# Patient Record
Sex: FEMALE | Race: ASIAN | NOT HISPANIC OR LATINO | Employment: UNEMPLOYED | URBAN - METROPOLITAN AREA
[De-identification: names, ages, dates, MRNs, and addresses within clinical notes are randomized per-mention and may not be internally consistent; named-entity substitution may affect disease eponyms.]

---

## 2023-06-03 LAB
CHLAMYDIA TRACH., AMPLIFIED: NEGATIVE
N. GONORRHEA, AMPLIFIED: NEGATIVE

## 2023-07-13 LAB
ALANINE AMINOTRANSFERASE (SGPT) (U/L) IN SER/PLAS: 19 U/L (ref 7–45)
ALBUMIN (G/DL) IN SER/PLAS: 4.7 G/DL (ref 3.4–5)
ALKALINE PHOSPHATASE (U/L) IN SER/PLAS: 67 U/L (ref 33–110)
ANION GAP IN SER/PLAS: 14 MMOL/L (ref 10–20)
ASPARTATE AMINOTRANSFERASE (SGOT) (U/L) IN SER/PLAS: 17 U/L (ref 9–39)
BILIRUBIN TOTAL (MG/DL) IN SER/PLAS: 0.6 MG/DL (ref 0–1.2)
CALCIUM (MG/DL) IN SER/PLAS: 10.9 MG/DL (ref 8.6–10.6)
CARBON DIOXIDE, TOTAL (MMOL/L) IN SER/PLAS: 31 MMOL/L (ref 21–32)
CHLORIDE (MMOL/L) IN SER/PLAS: 99 MMOL/L (ref 98–107)
CHOLESTEROL (MG/DL) IN SER/PLAS: 200 MG/DL (ref 0–199)
CHOLESTEROL IN HDL (MG/DL) IN SER/PLAS: 45.6 MG/DL
CHOLESTEROL/HDL RATIO: 4.4
CREATININE (MG/DL) IN SER/PLAS: 0.74 MG/DL (ref 0.5–1.05)
GFR FEMALE: >90 ML/MIN/1.73M2
GLUCOSE (MG/DL) IN SER/PLAS: 102 MG/DL (ref 74–99)
LDL: 125 MG/DL (ref 0–99)
POTASSIUM (MMOL/L) IN SER/PLAS: 4.2 MMOL/L (ref 3.5–5.3)
PROTEIN TOTAL: 7.6 G/DL (ref 6.4–8.2)
SODIUM (MMOL/L) IN SER/PLAS: 140 MMOL/L (ref 136–145)
THYROTROPIN (MIU/L) IN SER/PLAS BY DETECTION LIMIT <= 0.05 MIU/L: 1.03 MIU/L (ref 0.44–3.98)
TRIGLYCERIDE (MG/DL) IN SER/PLAS: 146 MG/DL (ref 0–149)
UREA NITROGEN (MG/DL) IN SER/PLAS: 17 MG/DL (ref 6–23)
VLDL: 29 MG/DL (ref 0–40)

## 2023-07-20 ENCOUNTER — HOSPITAL ENCOUNTER (OUTPATIENT)
Dept: DATA CONVERSION | Facility: HOSPITAL | Age: 58
End: 2023-07-20
Attending: INTERNAL MEDICINE | Admitting: INTERNAL MEDICINE
Payer: COMMERCIAL

## 2023-07-20 DIAGNOSIS — E66.01 MORBID (SEVERE) OBESITY DUE TO EXCESS CALORIES (MULTI): ICD-10-CM

## 2023-07-20 DIAGNOSIS — K63.5 POLYP OF COLON: ICD-10-CM

## 2023-07-20 DIAGNOSIS — E55.9 VITAMIN D DEFICIENCY, UNSPECIFIED: ICD-10-CM

## 2023-07-20 DIAGNOSIS — K64.4 RESIDUAL HEMORRHOIDAL SKIN TAGS: ICD-10-CM

## 2023-07-20 DIAGNOSIS — G47.30 SLEEP APNEA, UNSPECIFIED: ICD-10-CM

## 2023-07-20 DIAGNOSIS — D12.5 BENIGN NEOPLASM OF SIGMOID COLON: ICD-10-CM

## 2023-07-20 DIAGNOSIS — I10 ESSENTIAL (PRIMARY) HYPERTENSION: ICD-10-CM

## 2023-07-20 DIAGNOSIS — Z12.11 ENCOUNTER FOR SCREENING FOR MALIGNANT NEOPLASM OF COLON: ICD-10-CM

## 2023-07-20 DIAGNOSIS — K64.8 OTHER HEMORRHOIDS: ICD-10-CM

## 2023-07-20 DIAGNOSIS — Z87.891 PERSONAL HISTORY OF NICOTINE DEPENDENCE: ICD-10-CM

## 2023-07-27 LAB
COMPLETE PATHOLOGY REPORT: NORMAL
CONVERTED CLINICAL DIAGNOSIS-HISTORY: NORMAL
CONVERTED FINAL DIAGNOSIS: NORMAL
CONVERTED FINAL REPORT PDF LINK TO COPY AND PASTE: NORMAL
CONVERTED GROSS DESCRIPTION: NORMAL

## 2023-08-07 ENCOUNTER — HOSPITAL ENCOUNTER (OUTPATIENT)
Dept: DATA CONVERSION | Facility: HOSPITAL | Age: 58
End: 2023-08-07
Attending: SURGERY | Admitting: SURGERY
Payer: COMMERCIAL

## 2023-08-07 DIAGNOSIS — K31.89 OTHER DISEASES OF STOMACH AND DUODENUM: ICD-10-CM

## 2023-08-07 DIAGNOSIS — K22.70 BARRETT'S ESOPHAGUS WITHOUT DYSPLASIA: ICD-10-CM

## 2023-08-07 DIAGNOSIS — E66.01 MORBID (SEVERE) OBESITY DUE TO EXCESS CALORIES (MULTI): ICD-10-CM

## 2023-08-07 DIAGNOSIS — K44.9 DIAPHRAGMATIC HERNIA WITHOUT OBSTRUCTION OR GANGRENE: ICD-10-CM

## 2023-08-07 DIAGNOSIS — R12 HEARTBURN: ICD-10-CM

## 2023-08-07 DIAGNOSIS — K29.70 GASTRITIS, UNSPECIFIED, WITHOUT BLEEDING: ICD-10-CM

## 2023-08-30 ENCOUNTER — LAB (OUTPATIENT)
Dept: LAB | Facility: LAB | Age: 58
End: 2023-08-30
Payer: COMMERCIAL

## 2023-08-30 LAB
ACTIVATED PARTIAL THROMBOPLASTIN TIME IN PPP BY COAGULATION ASSAY: 29 SEC (ref 27–38)
ALANINE AMINOTRANSFERASE (SGPT) (U/L) IN SER/PLAS: 23 U/L (ref 7–45)
ALBUMIN (G/DL) IN SER/PLAS: 4.4 G/DL (ref 3.4–5)
ALKALINE PHOSPHATASE (U/L) IN SER/PLAS: 64 U/L (ref 33–110)
AMPHETAMINE (PRESENCE) IN URINE BY SCREEN METHOD: NORMAL
ANION GAP IN SER/PLAS: 13 MMOL/L (ref 10–20)
ASPARTATE AMINOTRANSFERASE (SGOT) (U/L) IN SER/PLAS: 17 U/L (ref 9–39)
BARBITURATES PRESENCE IN URINE BY SCREEN METHOD: NORMAL
BASOPHILS (10*3/UL) IN BLOOD BY AUTOMATED COUNT: 0.07 X10E9/L (ref 0–0.1)
BASOPHILS/100 LEUKOCYTES IN BLOOD BY AUTOMATED COUNT: 1 % (ref 0–2)
BENZODIAZEPINE (PRESENCE) IN URINE BY SCREEN METHOD: NORMAL
BILIRUBIN TOTAL (MG/DL) IN SER/PLAS: 0.7 MG/DL (ref 0–1.2)
C PEPTIDE (NG/ML) IN SER/PLAS: 2.9 NG/ML (ref 0.7–3.9)
CALCIDIOL (25 OH VITAMIN D3) (NG/ML) IN SER/PLAS: 37 NG/ML
CALCIUM (MG/DL) IN SER/PLAS: 10.6 MG/DL (ref 8.6–10.6)
CANNABINOIDS IN URINE BY SCREEN METHOD: NORMAL
CARBON DIOXIDE, TOTAL (MMOL/L) IN SER/PLAS: 30 MMOL/L (ref 21–32)
CHLORIDE (MMOL/L) IN SER/PLAS: 102 MMOL/L (ref 98–107)
CHOLESTEROL (MG/DL) IN SER/PLAS: 185 MG/DL (ref 0–199)
CHOLESTEROL IN HDL (MG/DL) IN SER/PLAS: 45.3 MG/DL
CHOLESTEROL/HDL RATIO: 4.1
COBALAMIN (VITAMIN B12) (PG/ML) IN SER/PLAS: 1101 PG/ML (ref 211–911)
COCAINE (PRESENCE) IN URINE BY SCREEN METHOD: NORMAL
CREATININE (MG/DL) IN SER/PLAS: 0.65 MG/DL (ref 0.5–1.05)
DRUG SCREEN COMMENT URINE: NORMAL
EOSINOPHILS (10*3/UL) IN BLOOD BY AUTOMATED COUNT: 0.14 X10E9/L (ref 0–0.7)
EOSINOPHILS/100 LEUKOCYTES IN BLOOD BY AUTOMATED COUNT: 2.1 % (ref 0–6)
ERYTHROCYTE DISTRIBUTION WIDTH (RATIO) BY AUTOMATED COUNT: 13 % (ref 11.5–14.5)
ERYTHROCYTE MEAN CORPUSCULAR HEMOGLOBIN CONCENTRATION (G/DL) BY AUTOMATED: 32.6 G/DL (ref 32–36)
ERYTHROCYTE MEAN CORPUSCULAR VOLUME (FL) BY AUTOMATED COUNT: 87 FL (ref 80–100)
ERYTHROCYTES (10*6/UL) IN BLOOD BY AUTOMATED COUNT: 4.41 X10E12/L (ref 4–5.2)
ESTIMATED AVERAGE GLUCOSE FOR HBA1C: 131 MG/DL
FENTANYL URINE: NORMAL
FERRITIN (UG/LL) IN SER/PLAS: 171 UG/L (ref 8–150)
FOLATE (NG/ML) IN SER/PLAS: >24 NG/ML
GFR FEMALE: >90 ML/MIN/1.73M2
GLUCOSE (MG/DL) IN SER/PLAS: 99 MG/DL (ref 74–99)
HEMATOCRIT (%) IN BLOOD BY AUTOMATED COUNT: 38.4 % (ref 36–46)
HEMOGLOBIN (G/DL) IN BLOOD: 12.5 G/DL (ref 12–16)
HEMOGLOBIN A1C/HEMOGLOBIN TOTAL IN BLOOD: 6.2 %
IMMATURE GRANULOCYTES/100 LEUKOCYTES IN BLOOD BY AUTOMATED COUNT: 0.1 % (ref 0–0.9)
INR IN PPP BY COAGULATION ASSAY: 1 (ref 0.9–1.1)
IRON (UG/DL) IN SER/PLAS: 103 UG/DL (ref 35–150)
IRON BINDING CAPACITY (UG/DL) IN SER/PLAS: 376 UG/DL (ref 240–445)
IRON SATURATION (%) IN SER/PLAS: 27 % (ref 25–45)
LDL: 115 MG/DL (ref 0–99)
LEUKOCYTES (10*3/UL) IN BLOOD BY AUTOMATED COUNT: 6.7 X10E9/L (ref 4.4–11.3)
LYMPHOCYTES (10*3/UL) IN BLOOD BY AUTOMATED COUNT: 2.28 X10E9/L (ref 1.2–4.8)
LYMPHOCYTES/100 LEUKOCYTES IN BLOOD BY AUTOMATED COUNT: 33.8 % (ref 13–44)
MONOCYTES (10*3/UL) IN BLOOD BY AUTOMATED COUNT: 0.56 X10E9/L (ref 0.1–1)
MONOCYTES/100 LEUKOCYTES IN BLOOD BY AUTOMATED COUNT: 8.3 % (ref 2–10)
NEUTROPHILS (10*3/UL) IN BLOOD BY AUTOMATED COUNT: 3.68 X10E9/L (ref 1.2–7.7)
NEUTROPHILS/100 LEUKOCYTES IN BLOOD BY AUTOMATED COUNT: 54.7 % (ref 40–80)
NRBC (PER 100 WBCS) BY AUTOMATED COUNT: 0 /100 WBC (ref 0–0)
OPIATES (PRESENCE) IN URINE BY SCREEN METHOD: NORMAL
OXYCODONE (PRESENCE) IN URINE BY SCREEN METHOD: NORMAL
PARATHYRIN INTACT (PG/ML) IN SER/PLAS: 56.6 PG/ML (ref 18.5–88)
PHENCYCLIDINE (PRESENCE) IN URINE BY SCREEN METHOD: NORMAL
PLATELETS (10*3/UL) IN BLOOD AUTOMATED COUNT: 321 X10E9/L (ref 150–450)
POTASSIUM (MMOL/L) IN SER/PLAS: 4.1 MMOL/L (ref 3.5–5.3)
PROTEIN TOTAL: 7.3 G/DL (ref 6.4–8.2)
PROTHROMBIN TIME (PT) IN PPP BY COAGULATION ASSAY: 10.7 SEC (ref 9.8–12.8)
SODIUM (MMOL/L) IN SER/PLAS: 141 MMOL/L (ref 136–145)
THYROTROPIN (MIU/L) IN SER/PLAS BY DETECTION LIMIT <= 0.05 MIU/L: 1.05 MIU/L (ref 0.44–3.98)
TRIGLYCERIDE (MG/DL) IN SER/PLAS: 123 MG/DL (ref 0–149)
UREA NITROGEN (MG/DL) IN SER/PLAS: 22 MG/DL (ref 6–23)
VLDL: 25 MG/DL (ref 0–40)

## 2023-08-31 LAB — H. PYLORI UBIT: NEGATIVE

## 2023-09-02 LAB
COPPER: 116.1 UG/DL (ref 80–155)
ZINC,SERUM OR PLASMA: 90.7 UG/DL (ref 60–120)

## 2023-09-04 LAB
COTININE BLOOD QUANTITATIVE: <5 NG/ML
NICOTINE BLOOD QUANTITATIVE: <5 NG/ML

## 2023-09-06 LAB — VITAMIN B1, WHOLE BLOOD: 138 NMOL/L (ref 70–180)

## 2023-09-08 PROBLEM — D12.6 TUBULAR ADENOMA OF COLON: Status: ACTIVE | Noted: 2023-09-08

## 2023-09-08 PROBLEM — R94.31 ABNORMAL EKG: Status: ACTIVE | Noted: 2023-09-08

## 2023-09-08 PROBLEM — R06.83 SNORING: Status: ACTIVE | Noted: 2023-09-08

## 2023-09-08 PROBLEM — H90.3 SENSORINEURAL HEARING LOSS, BILATERAL: Status: ACTIVE | Noted: 2023-09-08

## 2023-09-08 PROBLEM — R63.5 ABNORMAL WEIGHT GAIN: Status: ACTIVE | Noted: 2023-09-08

## 2023-09-08 PROBLEM — E66.01 MORBID OBESITY WITH BMI OF 40.0-44.9, ADULT (MULTI): Status: ACTIVE | Noted: 2023-09-08

## 2023-09-08 PROBLEM — F54 PSYCHOLOGICAL FACTORS AFFECTING MORBID OBESITY (MULTI): Status: ACTIVE | Noted: 2023-09-08

## 2023-09-08 PROBLEM — G47.33 OSA (OBSTRUCTIVE SLEEP APNEA): Status: ACTIVE | Noted: 2023-09-08

## 2023-09-08 PROBLEM — H60.8X3 CHRONIC ECZEMATOUS OTITIS EXTERNA OF BOTH EARS: Status: ACTIVE | Noted: 2023-09-08

## 2023-09-08 PROBLEM — G47.9 DISTURBANCE IN SLEEP BEHAVIOR: Status: ACTIVE | Noted: 2023-09-08

## 2023-09-08 PROBLEM — K08.9 DENTAL DISORDER: Status: ACTIVE | Noted: 2023-09-08

## 2023-09-08 PROBLEM — N63.20 LEFT BREAST MASS: Status: ACTIVE | Noted: 2023-09-08

## 2023-09-08 PROBLEM — I10 HYPERTENSION: Status: ACTIVE | Noted: 2023-09-08

## 2023-09-08 PROBLEM — E66.01 PSYCHOLOGICAL FACTORS AFFECTING MORBID OBESITY (MULTI): Status: ACTIVE | Noted: 2023-09-08

## 2023-09-08 PROBLEM — K21.9 GASTROESOPHAGEAL REFLUX DISEASE: Status: ACTIVE | Noted: 2023-09-08

## 2023-09-08 PROBLEM — R92.8 ABNORMAL MAMMOGRAM: Status: ACTIVE | Noted: 2023-09-08

## 2023-09-08 PROBLEM — F43.21 GRIEVING: Status: ACTIVE | Noted: 2023-09-08

## 2023-09-08 PROBLEM — I45.10 INCOMPLETE RIGHT BUNDLE BRANCH BLOCK (RBBB): Status: ACTIVE | Noted: 2023-09-08

## 2023-09-08 RX ORDER — FUROSEMIDE 40 MG/1
40 TABLET ORAL 2 TIMES DAILY
COMMUNITY
Start: 2011-11-05 | End: 2023-10-24 | Stop reason: ALTCHOICE

## 2023-09-08 RX ORDER — OMEPRAZOLE 40 MG/1
40 CAPSULE, DELAYED RELEASE ORAL DAILY
COMMUNITY
End: 2023-10-24 | Stop reason: ALTCHOICE

## 2023-09-08 RX ORDER — POTASSIUM CHLORIDE 20 MEQ/1
20 TABLET, EXTENDED RELEASE ORAL 2 TIMES DAILY
COMMUNITY
Start: 2011-11-05 | End: 2023-10-24 | Stop reason: ALTCHOICE

## 2023-09-08 RX ORDER — LISINOPRIL AND HYDROCHLOROTHIAZIDE 20; 25 MG/1; MG/1
1 TABLET ORAL DAILY
COMMUNITY
End: 2023-10-12 | Stop reason: ALTCHOICE

## 2023-09-08 RX ORDER — PHENTERMINE HYDROCHLORIDE 37.5 MG/1
37.5 TABLET ORAL DAILY
COMMUNITY
End: 2023-10-12 | Stop reason: ALTCHOICE

## 2023-09-08 RX ORDER — LEVOCETIRIZINE DIHYDROCHLORIDE 5 MG/1
1 TABLET, FILM COATED ORAL EVERY EVENING
COMMUNITY
Start: 2022-07-29 | End: 2023-10-24 | Stop reason: ALTCHOICE

## 2023-09-08 RX ORDER — AMITRIPTYLINE HYDROCHLORIDE 25 MG/1
1-2 TABLET, FILM COATED ORAL NIGHTLY
COMMUNITY
Start: 2022-07-29 | End: 2023-10-24 | Stop reason: ALTCHOICE

## 2023-09-08 RX ORDER — GLUCOSAMINE/MSM/CHONDROIT SULF 500-166.6
TABLET ORAL
COMMUNITY
End: 2024-01-17 | Stop reason: WASHOUT

## 2023-09-08 RX ORDER — CETIRIZINE HYDROCHLORIDE 10 MG/1
1 TABLET ORAL NIGHTLY
COMMUNITY
Start: 2022-08-25 | End: 2023-10-24 | Stop reason: ALTCHOICE

## 2023-09-29 VITALS — BODY MASS INDEX: 40.81 KG/M2 | HEIGHT: 65 IN | WEIGHT: 244.93 LBS

## 2023-10-10 ENCOUNTER — TELEMEDICINE CLINICAL SUPPORT (OUTPATIENT)
Dept: SURGERY | Facility: CLINIC | Age: 58
End: 2023-10-10
Payer: COMMERCIAL

## 2023-10-10 ENCOUNTER — DOCUMENTATION (OUTPATIENT)
Dept: CARDIOLOGY | Facility: CLINIC | Age: 58
End: 2023-10-10

## 2023-10-10 ENCOUNTER — TELEPHONE (OUTPATIENT)
Dept: SURGERY | Facility: CLINIC | Age: 58
End: 2023-10-10

## 2023-10-10 VITALS — WEIGHT: 242 LBS | BODY MASS INDEX: 40.32 KG/M2 | HEIGHT: 65 IN

## 2023-10-10 DIAGNOSIS — E66.01 MORBID OBESITY WITH BMI OF 40.0-44.9, ADULT (MULTI): Primary | ICD-10-CM

## 2023-10-10 PROBLEM — Z01.810 ENCOUNTER FOR PRE-OPERATIVE CARDIOVASCULAR CLEARANCE: Status: ACTIVE | Noted: 2023-10-10

## 2023-10-10 NOTE — PROGRESS NOTES
"PREOPERATIVE, MULTIDISCIPLINARY, MEDICALLY SUPERVISED, REDUCED CALORIE DIET, BEHAVIOR MODIFICATION AND EXERCISE PROGRAM    S:  The patient has been working on practicing pre-op behaviors.  24 hour food recall: B: Slimfast high protein shake; L: Premier Protein shake and D: riced cauliflower and beef cubes    O:   weight: 242.0 lbs.     Ht:    1.651 m (5' 5\")        BMI: 40.7     Goal: 5% body weight loss over the course of program    Dietary recommendation:   1. Continue to drink at least 64 ounces of water and calorie free, non-carbonated, and decaffeinated beverages daily  2. Practice the 30-30-30 rule by drinking between meals.  3. Structure your meal plan - have 3 meals and 1 snack daily.  4. Have balanced meals that always contain a good source of protein.  5. Increase intake of non-starchy vegetables.  Have 5 servings fruits and vegetables daily.   6. Continue to take a multivitamin daily.  7. Increase physical activity by 10-15 minutes to an end goal of 60 minutes 5 x per week.    Group Topic: Eating Triggers and Controlling Environment    Behavioral recommendation: Pt will be able to identify eating triggers and how to avoid them.    A/P: Pt appears to be able to recognize eating triggers and how to avoid eating when not hungry and/or not eating when it is not time for a meal or snack. Pt will continue to practice being mindful of healthy eating habits.    Exercise: Cardio 4x/wk for 45-60 min.  T  he nutritional clearance for surgery.  She is scheduled for the [facility] zoom on 10-18-23 and will follow-up in 1 month for accountability ahead of surgery.    Tonya Gonzalez, KYLE, LD  "

## 2023-10-10 NOTE — PROGRESS NOTES
Chief Complaint    DEX TOLEDO is being seen for a cardiovascular evaluation of pre-operative clearance.      History of Present Illness  57 yo female with history significant for HTN, former smoker and BMI 40.8 who was referred to cardiology for preop clearance for bariatric surgery. She exercises routinely and can climb 2 flights of stairs w/o problems other than knee pain. She is asymptomatic from the cardiac stand point and denies chest pain, SANTILLAN, orthopnea, NPD, nocturia, palpitations, dizziness, syncope or claudication. Her HTN is well controlled.     Her EKG shows IRBB plus negative T waves in V1 and V3 with nonspecific repolarization abnormalities in the other precordial leads. No prior tracings to compare.     Her most recent lipid panel from 7/2023 showed borderline cholesterol with mildly elevated LDL (125). CMP showed an elevated total calcium but when corrected by albuminemia is within normal range.      Active Problems  Problems    · Abnormal mammogram (793.80) (R92.8)   · Abnormal weight gain (783.1) (R63.5)   · Allergic rhinitis (477.9) (J30.9)   · Bariatric surgery status (V45.86) (Z98.84)   · Breast asymmetry (611.89) (N64.89)   · Chronic eczematous otitis externa of both ears (380.23) (H60.8X3)   · Colonoscopy planned   · Disturbance in sleep behavior (780.50) (G47.9)   · Encounter for gynecological examination (V72.31) (Z01.419)   · Gastroesophageal reflux disease (530.81) (K21.9)   · Hypertension (401.9) (I10)   · Menopausal symptoms (627.2) (N95.1)   · Morbid obesity with BMI of 40.0-44.9, adult (278.01,V85.41) (E66.01,Z68.41)   · Obesity (278.00) (E66.9)   · Obstructive sleep apnea, adult (327.23) (G47.33)   · History of Otalgia, right (388.70) (H92.01)   · Pain, hand (729.5) (M79.643)   · Pre-bariatric surgery nutrition evaluation (V65.3) (Z71.3)   · Psychological factors affecting morbid obesity (316,278.01) (E66.01,F54)   · Right ankle pain (719.47) (M25.571)   · History of Right foot pain  (729.5) (M79.671)   · History of Right knee pain (719.46) (M25.561)   · Sensorineural hearing loss, bilateral (389.18) (H90.3)   · Snoring (786.09) (R06.83)   · History of Sudden death of family member (V61.07) (Z63.4)    Surgical History  Problems    · History of Back Surgery   · History of Bunionectomy   · History of Hip surgery   · History of Knee Replacement    Past Medical History  Problems    · Abnormal weight gain (783.1) (R63.5)   · Allergic rhinitis (477.9) (J30.9)   · Disturbance in sleep behavior (780.50) (G47.9)   · Gastroesophageal reflux disease (530.81) (K21.9)   · Hypertension (401.9) (I10)   · Obesity (278.00) (E66.9)   · History of Otalgia, right (388.70) (H92.01)   · Resolved Date: 04 Jun 2018   · Pain, hand (729.5) (M79.643)   · Right ankle pain (719.47) (M25.571)   · History of Right foot pain (729.5) (M79.671)   · Resolved Date: 04 Jun 2018   · History of Right knee pain (719.46) (M25.561)   · Resolved Date: 04 Jun 2018   · Snoring (786.09) (R06.83)   · History of Sudden death of family member (V61.07) (Z63.4)   · Resolved Date: 04 Jun 2018    Current Meds    Medication Name Instruction   Collagen Ultra Oral Capsule TAKE 1 CAPSULE Daily   Lisinopril-hydroCHLOROthiazide 20-25 MG Oral Tablet TAKE 1 TABLET BY MOUTH EVERY DAY   Omeprazole 40 MG Oral Capsule Delayed Release TAKE 1 CAPSULE BY MOUTH EVERY DAY   PEG 3350-KCl-Na Bicarb-NaCl 420 GM Oral Solution Reconstituted MIX AND DRINK 4000 ML AS PER SPLIT DOSE  ENDOSCOPY INSTRUCTIONS   Probiotic CAPS USE AS DIRECTED.   Vitamin D CAPS      Allergies  Medication    · No Known Drug Allergies  Recorded By: Jeanne Cloud; 3/25/2015 12:02:33 PM    Family History  Mother    · Family history of diabetes mellitus (V18.0) (Z83.3)   · Family history of hypertension (V17.49) (Z82.49)  Father    · Family history of diabetes mellitus (V18.0) (Z83.3)   · Family history of hypertension (V17.49) (Z82.49)  Brother    · Family history of diabetes mellitus (V18.0)  (Z83.3)    Social History  Problems    · Former smoker (V15.82) (Z87.891)   · Part-time employment   · Single   · Tobacco use (305.1) (Z72.0)    Review of Systems  A 14 point review of systems was asked. All questions were negative except for pertinent positives listed in the HPI.     Vitals  Vital Signs    Recorded: [id number] 08:59AM   Heart Rate 77   Systolic 118, LUE, Sitting   Diastolic 80, LUE, Sitting   Height 5 ft 5 in   Weight 245 lb 8 oz   BMI Calculated 40.85 kg/m2   BSA Calculated 2.16   Tobacco Use b) No   Falls Screening (Age 18+) a) No falls within the last year   O2 Saturation 94   Pain Scale 0     Physical Exam  General: Sitting up comfortably in chair; in no apparent distress.  HEENT: Normocephalic; atraumatic. Well hydrated.  Eyes: Anicteric sclera. Extraocular movement intact  Neck: Supple; no thyromegaly; normal jugular venous pressure.  Respiratory: Bilateral air entry equal. No wheezing.  Cardiovascular: Normal S1, S2; no murmurs auscultated.  Abdomen: Nondistended; nontender. (+) bowel sounds  Extremities: No peripheral edema present. Pulses 2+ diffusely  Neurological: Oriented to time, place, and person; nonfocal.  Psychiatric: Normal affect.    Impressions    59 yo female with history of HTN, former smoker and BMI 40.8 who was referred for preop clearance for bariatric surgery and is asymptomatic from the cardiac stand point with reasonable exercise capacity. Her EKG is abnormal d/t IRBB and precordial negative T waves.  She underwent en echocardiogram on 9/18/2023 which showed a left ventricle with elevated ejection fraction and a normal RVSP.   Whit this result,  there is no contraindication for bariatric surgery with general anesthesia from the cardiovascular standpoint. Her predicted perioperative 30 days MACE risk is 0.1%.     Wes Edmonds MD

## 2023-10-10 NOTE — TELEPHONE ENCOUNTER
Spoke with patient about outstanding clearances for Bariatric Surgery. Pulm and NBP class scheduled for later in October. Emailed to pts cardiologist to see if the patient is cleared from ECG done in September.

## 2023-10-12 ENCOUNTER — OFFICE VISIT (OUTPATIENT)
Dept: PRIMARY CARE | Facility: CLINIC | Age: 58
End: 2023-10-12
Payer: COMMERCIAL

## 2023-10-12 VITALS — DIASTOLIC BLOOD PRESSURE: 68 MMHG | SYSTOLIC BLOOD PRESSURE: 105 MMHG | BODY MASS INDEX: 38.94 KG/M2 | WEIGHT: 234 LBS

## 2023-10-12 DIAGNOSIS — E66.9 CLASS 2 OBESITY WITHOUT SERIOUS COMORBIDITY WITH BODY MASS INDEX (BMI) OF 38.0 TO 38.9 IN ADULT, UNSPECIFIED OBESITY TYPE: ICD-10-CM

## 2023-10-12 DIAGNOSIS — M79.642 HAND PAIN, LEFT: ICD-10-CM

## 2023-10-12 DIAGNOSIS — R20.0 NUMBNESS OF FINGERS OF BOTH HANDS: ICD-10-CM

## 2023-10-12 DIAGNOSIS — E55.9 VITAMIN D DEFICIENCY: ICD-10-CM

## 2023-10-12 DIAGNOSIS — I10 HYPERTENSION, UNSPECIFIED TYPE: Primary | ICD-10-CM

## 2023-10-12 PROCEDURE — 3008F BODY MASS INDEX DOCD: CPT | Performed by: INTERNAL MEDICINE

## 2023-10-12 PROCEDURE — 3074F SYST BP LT 130 MM HG: CPT | Performed by: INTERNAL MEDICINE

## 2023-10-12 PROCEDURE — 3078F DIAST BP <80 MM HG: CPT | Performed by: INTERNAL MEDICINE

## 2023-10-12 PROCEDURE — 99214 OFFICE O/P EST MOD 30 MIN: CPT | Performed by: INTERNAL MEDICINE

## 2023-10-12 RX ORDER — IBUPROFEN 800 MG/1
800 TABLET ORAL 2 TIMES DAILY
Qty: 14 TABLET | Refills: 0 | Status: SHIPPED | OUTPATIENT
Start: 2023-10-12 | End: 2023-10-19

## 2023-10-12 RX ORDER — LISINOPRIL AND HYDROCHLOROTHIAZIDE 10; 12.5 MG/1; MG/1
1 TABLET ORAL DAILY
Qty: 90 TABLET | Refills: 1 | Status: SHIPPED | OUTPATIENT
Start: 2023-10-12 | End: 2024-04-09

## 2023-10-12 RX ORDER — DEXTROMETHORPHAN HYDROBROMIDE, GUAIFENESIN 5; 100 MG/5ML; MG/5ML
650 LIQUID ORAL EVERY 8 HOURS PRN
COMMUNITY

## 2023-10-12 RX ORDER — ERGOCALCIFEROL 1.25 MG/1
50000 CAPSULE ORAL
Qty: 12 CAPSULE | Refills: 0 | Status: SHIPPED | OUTPATIENT
Start: 2023-10-12 | End: 2023-11-06

## 2023-10-12 ASSESSMENT — PATIENT HEALTH QUESTIONNAIRE - PHQ9
1. LITTLE INTEREST OR PLEASURE IN DOING THINGS: SEVERAL DAYS
2. FEELING DOWN, DEPRESSED OR HOPELESS: NOT AT ALL
SUM OF ALL RESPONSES TO PHQ9 QUESTIONS 1 AND 2: 1

## 2023-10-12 NOTE — PROGRESS NOTES
Subjective   Patient ID: Leonarda Quevedo is a 58 y.o. female who presents for FUV., Blood Pressure Check, and Hand Pain.    HPI  Patient in for a visit  Doing instacart carrying a lot of grocery bags  Starting a new job at  in [employer] next week  Had a whole bunch of labwork done with bariatric surgery Dr Polk , still has to see dietitian ,goal to lose  5% of her weight when she saw surgeon   Review of Systems  General: Denies fever, chills, night sweats,  Eyes: Negative for recent visual changes  Ears, Nose, Throat :  Negative for hearing changes, sinus discomfort  Dermatologic: Negative for new skin conditions, rash  Respiratory: Negative for wheezing, shortness of breath, cough  Cardiovascular: Negative for chest pain, palpitations, or leg swelling  Gastrointestinal: Negative for nausea/vomiting, abdominal pain, changes in bowel habits  Genitourinary NEGATIVE FOR URINARY INCONTINENCE urgency , frequency, discomfort   Musculoskeletal: see hpi  Neurological: Negative for headaches, dizziness    Previous history  Past Medical History:   Diagnosis Date    Abnormal weight gain 06/24/2022    Abnormal weight gain    Abrasion of unspecified wrist, initial encounter 09/21/2017    Cat scratch of wrist    Adjustment disorder with depressed mood 03/08/2017    Grieving    Allergic rhinitis, unspecified 08/25/2022    Allergic rhinitis    Allergy, unspecified, initial encounter 07/29/2022    Allergy    Cough, unspecified 09/10/2018    Cough    Disappearance and death of family member 10/06/2016    Sudden death of family member    Disorder of teeth and supporting structures, unspecified 02/22/2016    Dental disorder    Dorsalgia, unspecified 07/29/2022    Back pain    Encounter for gynecological examination (general) (routine) without abnormal findings 03/08/2017    Visit for gynecologic examination    Encounter for other screening for malignant neoplasm of breast 09/07/2022    Breast screening    Encounter for screening for  malignant neoplasm of colon 09/07/2022    Colon cancer screening    Essential (primary) hypertension 11/09/2022    Hypertension    Gastro-esophageal reflux disease without esophagitis 09/07/2022    Gastroesophageal reflux disease    Obesity, unspecified 11/09/2022    Obesity    Otalgia, right ear 02/08/2016    Otalgia, right    Other conditions influencing health status 03/29/2017    History of cough    Other long term (current) drug therapy 11/09/2022    Medication management    Other specified soft tissue disorders 02/08/2016    Swelling of left hand    Pain in right ankle and joints of right foot 03/08/2017    Right ankle pain    Pain in right foot 12/08/2016    Right foot pain    Pain in right hand 09/07/2022    Bilateral hand pain    Pain in right hand 04/16/2018    Bilateral hand pain    Pain in right knee 07/29/2022    Bilateral knee pain    Pain in right knee 03/07/2018    Right knee pain    Pain in right shoulder 03/12/2020    Bilateral shoulder pain    Pain in unspecified hand 09/07/2022    Pain, hand    Personal history of other diseases of the nervous system and sense organs 12/08/2016    History of sleep disturbance    Pruritus, unspecified 11/09/2022    Ear itching     Past Surgical History:   Procedure Laterality Date    BACK SURGERY  03/25/2015    Back Surgery    TOTAL KNEE ARTHROPLASTY  03/25/2015    Knee Replacement     Social History     Tobacco Use    Smoking status: Former     Types: Cigarettes    Smokeless tobacco: Never   Substance Use Topics    Alcohol use: Yes     Comment: rarely     Family History   Problem Relation Name Age of Onset    Hypertension Mother      Diabetes Mother      Hypertension Father      Diabetes Father      Diabetes Brother       No Known Allergies  Current Outpatient Medications   Medication Instructions    acetaminophen (TYLENOL 8 HOUR) 650 mg, oral, Every 8 hours PRN, Do not crush, chew, or split.    amitriptyline (Elavil) 25 mg tablet 1-2 tablets, oral, Nightly     ascorbic acid/collagen hydr (COLLAGEN SKIN RENEWAL ORAL) 1 capsule, oral, Daily    cetirizine (ZyrTEC) 10 mg tablet 1 tablet, oral, Nightly    ergocalciferol, vitamin D2, (VITAMIN D2 ORAL) oral    furosemide (LASIX) 40 mg, oral, 2 times daily    L. acidophilus/Bifid. animalis 32 billion cell capsule oral    levocetirizine (Xyzal) 5 mg tablet 1 tablet, oral, Every evening    lisinopriL-hydrochlorothiazide 20-25 mg tablet 1 tablet, oral, Daily    multivitamin capsule 1 capsule, oral, Daily    omega 3-dha-epa-fish oil 360 mg-108 mg- 180 mg-1,200 mg capsule oral    omeprazole (PRILOSEC) 40 mg, oral, Daily    phentermine (ADIPEX-P) 37.5 mg, oral, Daily, as directed    potassium chloride CR 20 mEq ER tablet 20 mEq, oral, 2 times daily       Objective       Physical Exam  Vital Signs: as recorded above  General: Well groomed, well nourished with her mother with consent   Orientation:  Alert , oriented to time, place , and person   Mood and Affect:  Cooperative , no apparent distress normal affect  Skin: Good color, good turgor  Eyes: Extra ocular muscle movements intact, anicteric sclerae  Neck: Supple, full range of movement  Chest: Normal breath sounds, normal chest wall exam, symmetric, good air entry, clear to auscultation  Heart: Regular rate and rhythm, without murmur, gallop, or rubs  BACK:  no CTLS spine tenderness, no flank tenderness  Extremities: full range of movement  bilateral UE and bilateral LE,  no lower extremity edema  Neurological: Alert, oriented, cranial nerves II-XII intact except for visual acuity  Sensation:  Intact   Gait: normal steady      Assessment/Plan   Leonarda Quevedo is a 58 y.o. female who presents for the concerns below:    Problem List Items Addressed This Visit    None    HYPERTENSION PLAN:  , taking blood pressure medication will cut back to half dose , Follow low salt diet, exercise as discussed, weight control. Continue current medications     BILATERAL HAND NUMBNESS AND LEFT 3RD FINGER  SWELLING will have her use cock-up splints at night high dose ibuprofen bid with meals for 7 days  Also with shoulder pain     .obesity she is a candidate for surgery , seeing DR Polk, will see her nutritionist again has lost 10-12 lbs   told her may be the beginning of the year    Seeing pulmonary she quit smoking 5 months ago, better healing for surgery       Discussed with:   Return in : 3 months recheck bp     Portions of this note were generated using digital voice recognition software, and may contain grammatical errors       Ernestine Vivas MD  10/12/23  1:17 PM

## 2023-10-16 ENCOUNTER — TELEPHONE (OUTPATIENT)
Dept: PRIMARY CARE | Facility: CLINIC | Age: 58
End: 2023-10-16
Payer: COMMERCIAL

## 2023-10-16 DIAGNOSIS — M79.641 BILATERAL HAND PAIN: Primary | ICD-10-CM

## 2023-10-16 DIAGNOSIS — M79.642 BILATERAL HAND PAIN: Primary | ICD-10-CM

## 2023-10-16 NOTE — TELEPHONE ENCOUNTER
[facility] called.  She talked with  plastic surgeons.  They don't take care of nerve/hand issues.  [facility] says plastics told her that's more of neurology area.  [facility] cannot make appt for this issue with plastics.  AQ will need to update order.  LG

## 2023-10-18 ENCOUNTER — TELEMEDICINE CLINICAL SUPPORT (OUTPATIENT)
Dept: SURGERY | Facility: CLINIC | Age: 58
End: 2023-10-18
Payer: COMMERCIAL

## 2023-10-18 DIAGNOSIS — E66.9 OBESITY, CLASS II, BMI 35-39.9: Primary | ICD-10-CM

## 2023-10-20 ENCOUNTER — HOSPITAL ENCOUNTER (OUTPATIENT)
Dept: RESPIRATORY THERAPY | Facility: HOSPITAL | Age: 58
Discharge: HOME | End: 2023-10-20
Payer: COMMERCIAL

## 2023-10-20 ENCOUNTER — APPOINTMENT (OUTPATIENT)
Dept: PULMONOLOGY | Facility: HOSPITAL | Age: 58
End: 2023-10-20
Payer: COMMERCIAL

## 2023-10-20 DIAGNOSIS — Z98.84 BARIATRIC SURGERY STATUS: ICD-10-CM

## 2023-10-20 PROCEDURE — 94010 BREATHING CAPACITY TEST: CPT

## 2023-10-24 ENCOUNTER — TELEMEDICINE (OUTPATIENT)
Dept: PULMONOLOGY | Facility: CLINIC | Age: 58
End: 2023-10-24
Payer: COMMERCIAL

## 2023-10-24 DIAGNOSIS — Z98.84 BARIATRIC SURGERY STATUS: Primary | ICD-10-CM

## 2023-10-24 PROCEDURE — 99213 OFFICE O/P EST LOW 20 MIN: CPT | Performed by: NURSE PRACTITIONER

## 2023-10-24 PROCEDURE — 99213 OFFICE O/P EST LOW 20 MIN: CPT | Mod: GT | Performed by: NURSE PRACTITIONER

## 2023-10-24 RX ORDER — PNV NO.95/FERROUS FUM/FOLIC AC 28MG-0.8MG
100 TABLET ORAL DAILY
COMMUNITY
End: 2024-02-29 | Stop reason: ALTCHOICE

## 2023-10-24 ASSESSMENT — ENCOUNTER SYMPTOMS
RHINORRHEA: 0
EYE PAIN: 0
AGITATION: 0
VOICE CHANGE: 0
ABDOMINAL PAIN: 0
NERVOUS/ANXIOUS: 0
HEADACHES: 0
NAUSEA: 0
BACK PAIN: 0
WEAKNESS: 0
DIZZINESS: 0
ARTHRALGIAS: 0
FATIGUE: 0
FEVER: 0
MYALGIAS: 0
NUMBNESS: 0
SINUS PRESSURE: 0
VOMITING: 0
PALPITATIONS: 0
JOINT SWELLING: 0
DIARRHEA: 0

## 2023-10-24 NOTE — PROGRESS NOTES
Patient: Leonarda Quevedo    [id number]  : 1965 -- AGE 58 y.o.    Provider: SAMANTHA Miramontes-CNP     Location [facility]   Service Date: 10/24/2023              [facility]  Follow up visit note      HISTORY OF PRESENT ILLNESS       HISTORY OF PRESENT ILLNESS     Ms. Quevedo is a 58 year old female (former smoker~10 pack years) here for preop clearance for bariatric surgery. Last seen in clinic on 23.      She denies any cough, wheezing, SANTILLAN, SOB at rest, allergies, or CP.   She continues to not smoke.    GERD - doing well with diet changes   She has some numbness in her fingers - she know related to previous back surgery. She has an upcoming appt with [facility].      23:  Ms. Quevedo denies any cough, SANTILLAN, wheezing, SOB at rest, or CP. She had a cough when she stopped smoking - used tessalon and this helped. Cough has since resolved - rare dry cough. She had some GERD symptoms prior to diet changes - states that since she has been watching what she eats they have resolved. She has had some tingling in her fingertips - new over the last week. She had some tingling in her left hand - not as bothersome since she stopped wearing her apple watch. She does have some neck pain - feels she may need an adjustment from her chiropractor.      Previous pulmonary history: She has no history of recurrent infections, or lung disease as a child. She had no previous lung hx, never on oxygen or inhaler therapy.      Inhalers/nebulized medications: none      Hospitalization History: She has not been hospitalized over the last year for breathing related problem.     Sleep history: VINCE - on CPAP   ALLERGIES AND MEDICATIONS     ALLERGIES  No Known Allergies    MEDICATIONS  Current Outpatient Medications   Medication Sig Dispense Refill    acetaminophen (Tylenol 8 HOUR) 650 mg ER tablet Take 1 tablet (650 mg) by mouth every 8 hours if needed for mild pain (1 - 3).  Do not crush, chew, or split.      ascorbic acid/collagen hydr (COLLAGEN SKIN RENEWAL ORAL) Take 1 capsule by mouth once daily.      cyanocobalamin (Vitamin B-12) 100 mcg tablet Take 1 tablet (100 mcg) by mouth once daily.      ergocalciferol (Vitamin D-2) 1.25 MG (04692 UT) capsule Take 1 capsule (50,000 Units) by mouth 1 (one) time per week. 12 capsule 0    lisinopriL-hydrochlorothiazide 10-12.5 mg tablet Take 1 tablet by mouth once daily. 90 tablet 1    multivitamin capsule Take 1 capsule by mouth once daily.      omega 3-dha-epa-fish oil 360 mg-108 mg- 180 mg-1,200 mg capsule Take by mouth.      L. acidophilus/Bifid. animalis 32 billion cell capsule Take by mouth.       No current facility-administered medications for this visit.         PAST HISTORY     PAST MEDICAL HISTORY  - HTN   - back surgery   - VINCE - on CPAP   - knee surgery L  - foot surgery        PAST SURGICAL HISTORY  Past Surgical History:   Procedure Laterality Date    BACK SURGERY  2015    Back Surgery    TOTAL KNEE ARTHROPLASTY  2015    Knee Replacement       IMMUNIZATION HISTORY    There is no immunization history on file for this patient.    SOCIAL HISTORY  smokin-58 - quit 5 months ago. 4 cigarettes per day. 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 COPD - mom and dad       RESULTS/DATA     Pulmonary Function Test Results       10/20/23: FEV1/FVC 0.72/ FEV1 2.08 (96%)/ FVC 2.90 (107%)     Chest Radiograph     No results found for this or any previous visit from the past 2000 days.      Chest CT Scan     None on  record       Echocardiogram     Echocardiogram - 9/18/23 -     1. Left ventricular systolic function is hyperdynamic with a 70-75% estimated ejection fraction.  2. RVSP within normal limits.  RA normal size, RV normal size and function     REVIEW OF SYSTEMS     REVIEW OF SYSTEMS  Review of Systems   Constitutional:  Negative for fatigue and fever.   HENT:  Negative for congestion, postnasal drip, rhinorrhea, sinus pressure and voice change.    Eyes:  Negative for pain and visual disturbance.   Cardiovascular:  Negative for chest pain, palpitations and leg swelling.   Gastrointestinal:  Negative for abdominal pain, diarrhea, nausea and vomiting.   Endocrine: Negative for cold intolerance and heat intolerance.   Musculoskeletal:  Negative for arthralgias, back pain, joint swelling and myalgias.   Skin:  Negative for rash.   Neurological:  Negative for dizziness, weakness, numbness and headaches.   Psychiatric/Behavioral:  Negative for agitation. The patient is not nervous/anxious.          PHYSICAL EXAM     VITAL SIGNS: There were no vitals taken for this visit.     CURRENT WEIGHT: [unfilled]  BMI: [unfilled]  PREVIOUS WEIGHTS:  Wt Readings from Last 3 Encounters:   10/12/23 106 kg (234 lb)   10/10/23 110 kg (242 lb)   07/20/23 111 kg (245 lb 8 oz)       Physical Exam -- not done - virtual visit     ASSESSMENT/PLAN       Ms. Quevedo is a 58 year old female (former smoker~10 pack years) here for initial evaluation for preop clearance for bariatric surgery      1. Bariatric surgery: former smoker   # Preoperative evaluation:  Patient is at increased risk of postoperative pulmonary complications given  age >50 years, poor general health status (American Society of Anesthesiologists [ASA] class >2), and obstructive sleep apnea.  However this increased risk should not preclude the surgery.  - Spirometry normal   - To minimize the risk for complications we recommend the following: incentive spirometry to be started before  surgery, Use of CPAP machine, early ambulation, minimize sedation, DVT prophylaxis if appropriate per surgical team.   -  can call pulmonary consult while inpatient if needed     Preop risk assessment:  --- ARISCAT score 26 pts = moderate risk - 13.3 % risk of in- hospital post-op pulmonary complications.   --- Per arozullah respiratory failure index - Estimated risk probability for postoperative respiratory failure  0.22%.     Virtual visit - spoke with the patient on live audio/ video chat for 5 minutes.

## 2023-11-02 ENCOUNTER — NUTRITION (OUTPATIENT)
Dept: SURGERY | Facility: CLINIC | Age: 58
End: 2023-11-02
Payer: COMMERCIAL

## 2023-11-02 VITALS — WEIGHT: 239 LBS | HEIGHT: 65 IN | BODY MASS INDEX: 39.82 KG/M2

## 2023-11-02 NOTE — PROGRESS NOTES
"PREOPERATIVE, MULTIDISCIPLINARY, MEDICALLY SUPERVISED, REDUCED CALORIE DIET, BEHAVIOR MODIFICATION AND EXERCISE PROGRAM    S:  The patient has been working on practicing pre-op behaviors.  24 hour food recall: B: Premier protein shake; L: lowfat cottage cheese and 3 oz. salmon; s: 1/2 protein bar D: baked salmon and green beans; s: 1/2 protein bar    O:     Vitals:    11/02/23 1138   Weight: 108 kg (239 lb)    Ht:   1.651 m (5' 5\")     BMI: Body mass index is 39.77 kg/m².    Goal: 5% body weight loss over the course of program    Dietary recommendation:   1. Continue to drink at least 64 ounces of water and calorie free, non-carbonated, and decaffeinated beverages  2. Practice the 30-30-30 rule by drinking between meals.  3. Structure your meal plan - have 3 meals and 1 snack daily.  4. Have balanced meals that always contain a good source of protein.  5. Increase intake of non-starchy vegetables.  Have 5 servings fruits and vegetables daily.   6. Continue to take a multivitamin daily.  7. Increase physical activity by 10-15 minutes to an end goal of 60 minutes 5 x per week.    Group Topic: Eating Triggers and Controlling Environment    Behavioral recommendation: Pt will be able to identify eating triggers and how to avoid them.    A/P: Pt appears to be able to recognize eating triggers and how to avoid eating when not hungry and/or not eating when it is not time for a meal or snack. Pt will continue to practice being mindful of healthy eating habits.    Exercise: Walking 2-3x/wk 30 min.    Patient will follow-up on 12/22/23 for accountability ahead of surgery.    Tonya Gonzalez, RD, LD  "

## 2023-11-03 ENCOUNTER — DOCUMENTATION (OUTPATIENT)
Dept: SURGERY | Facility: CLINIC | Age: 58
End: 2023-11-03
Payer: COMMERCIAL

## 2023-11-03 NOTE — PROGRESS NOTES
Letter of Medical Necessity    23      ATTN: Pre-Authorization  Department           Tentative Surgery Date:  2023    RE: Leonarda Quevedo    : 1965         BMI: 39.77    Weight: 239      Height: 5'5''     Ms. Leonarda Quevedo suffers from multiple health conditions including Morbid Obesity (E66.01). An extensive clinical evaluation has been completed and the final recommendation has been provided to Ms. Quevedo; explanation of short and long term surgical risks vs. benefits has been reviewed at length. Ms. Quevedo has verbalized an understanding of the associated risks, has agreed to comply with behavioral and lifestyle changes that support a successful post-surgical outcome, resolution of comorbid conditions and improvement in quality of life.    The patient suffers from the following health conditions:   Patient Active Problem List   Diagnosis    Abnormal mammogram    Abnormal weight gain    Gastroesophageal reflux disease    Hypertension    Obesity    Metabolic syndrome    Grieving    Dental disorder    Tubular adenoma of colon    Snoring    Sensorineural hearing loss, bilateral    Psychological factors affecting morbid obesity (CMS/HCC)    VINCE (obstructive sleep apnea)    Morbid obesity with BMI of 40.0-44.9, adult (CMS/HCC)    Left breast mass    Incomplete right bundle branch block (RBBB)    Disturbance in sleep behavior    Chronic eczematous otitis externa of both ears    Abnormal EKG    Encounter for pre-operative cardiovascular clearance         We kindly request careful review of the following documents: Surgeon's consultation with weight related comorbidities and diagnoses, primary care support letter, psychiatric evaluation,  nutrition assessment and other pertinent information required by the member's plan.      Ms. Quevedo has attended our pre-operative educational programs and verbalizes that she has an understanding of the behaviors and choices necessary to be successful with bariatric surgery  for a lifetime. Ms. Quevedo further understands that in order to be successful she must attend post-operative visits at 1week, 6 weeks, 3 months, 6 months, 12 months, and annually to review her progress and consult with our registered dietitian. In addition Ms. Quevedo agrees to attend monthly support group meetings hosted by our licensed program staff to further enhance her chances of successful lifetime weight loss. she has agreed to participate in exercise 5 days per week as tolerated and has already been advised  to increase her physical activity in preparation for surgery.              We kindly request review of prior-authorization for a 1 to 2 day hospital stay for procedure Laparoscopic sleeve gastrectomy  38428 and Hiatal hernia repair 52497 with Dr. Tyshawn Arzola  NPI:[id number]  TID: [id number] at [facility]. Tentative surgery date 12/20/2023.    Please fax your approval or requests for  additional information to the attention of Martha Licea, Patient Navigator at [phone], this is a secured fax line.    We sincerely appreciate your thoughtful review of this case.      Sincerely,  Dr. Tyshawn Arzola  [facility]    [place]

## 2023-11-05 DIAGNOSIS — E55.9 VITAMIN D DEFICIENCY: ICD-10-CM

## 2023-11-06 DIAGNOSIS — M79.642 BILATERAL HAND PAIN: Primary | ICD-10-CM

## 2023-11-06 DIAGNOSIS — M79.641 BILATERAL HAND PAIN: Primary | ICD-10-CM

## 2023-11-06 RX ORDER — ERGOCALCIFEROL 1.25 MG/1
50000 CAPSULE ORAL
Qty: 12 CAPSULE | Refills: 0 | Status: SHIPPED | OUTPATIENT
Start: 2023-11-06

## 2023-11-07 ENCOUNTER — OFFICE VISIT (OUTPATIENT)
Dept: ORTHOPEDIC SURGERY | Facility: CLINIC | Age: 58
End: 2023-11-07
Payer: COMMERCIAL

## 2023-11-07 ENCOUNTER — ANCILLARY PROCEDURE (OUTPATIENT)
Dept: RADIOLOGY | Facility: CLINIC | Age: 58
End: 2023-11-07
Payer: COMMERCIAL

## 2023-11-07 VITALS — BODY MASS INDEX: 40.8 KG/M2 | WEIGHT: 239 LBS | HEIGHT: 64 IN

## 2023-11-07 DIAGNOSIS — M79.641 BILATERAL HAND PAIN: ICD-10-CM

## 2023-11-07 DIAGNOSIS — M79.642 BILATERAL HAND PAIN: ICD-10-CM

## 2023-11-07 DIAGNOSIS — G56.03 CARPAL TUNNEL SYNDROME, BILATERAL: Primary | ICD-10-CM

## 2023-11-07 DIAGNOSIS — M65.332 TRIGGER FINGER, LEFT MIDDLE FINGER: ICD-10-CM

## 2023-11-07 PROCEDURE — L3908 WHO COCK-UP NONMOLDE PRE OTS: HCPCS | Performed by: ORTHOPAEDIC SURGERY

## 2023-11-07 PROCEDURE — 73130 X-RAY EXAM OF HAND: CPT | Mod: 50

## 2023-11-07 PROCEDURE — 3074F SYST BP LT 130 MM HG: CPT | Performed by: ORTHOPAEDIC SURGERY

## 2023-11-07 PROCEDURE — 3078F DIAST BP <80 MM HG: CPT | Performed by: ORTHOPAEDIC SURGERY

## 2023-11-07 PROCEDURE — 99024 POSTOP FOLLOW-UP VISIT: CPT | Performed by: ORTHOPAEDIC SURGERY

## 2023-11-07 PROCEDURE — 73130 X-RAY EXAM OF HAND: CPT | Mod: BILATERAL PROCEDURE | Performed by: RADIOLOGY

## 2023-11-07 PROCEDURE — 1036F TOBACCO NON-USER: CPT | Performed by: ORTHOPAEDIC SURGERY

## 2023-11-07 PROCEDURE — 3008F BODY MASS INDEX DOCD: CPT | Performed by: ORTHOPAEDIC SURGERY

## 2023-11-07 ASSESSMENT — PAIN - FUNCTIONAL ASSESSMENT: PAIN_FUNCTIONAL_ASSESSMENT: 0-10

## 2023-11-07 ASSESSMENT — PAIN SCALES - GENERAL: PAINLEVEL_OUTOF10: 10 - WORST POSSIBLE PAIN

## 2023-11-07 NOTE — LETTER
November 7, 2023     Ernestine Vivas MD  [id number] [facility] [address]  [facility] [address]    Patient: Leonarda Quevedo   YOB: 1965   Date of Visit: 11/7/2023       Dear Dr. Ernestine Vivas MD:    Thank you for referring Leonarda Quevedo to me for evaluation. Below are my notes for this consultation.  If you have questions, please do not hesitate to call me. I look forward to following your patient along with you.       Sincerely,     Raymon Amador MD      CC: Sanya Beaulieu MD  ______________________________________________________________________________________    Second postoperative visit after melanoma reexcision from the dorsal aspect of the left hand.  Date of surgery was October 12.  Patient making good progress in therapy.  Still has a little bit of tightness when she attempts to make a fist.    Examination reveals that her surgical wounds are healing nicely.  She has a small area of granulation tissue along the distal and ulnar margin of the rotational flap.  No erythema induration or drainage.  No visible tendon structures.  She is able to demonstrate full extension.  She has some persistent extension tightness when she attempts to make a tight fist but has near full composite digital flexion.    Impression: Melanoma left hand.    Plan: I have encouraged her to continue with her scar massage and range of motion techniques.  Her fingers still have a little bit of swelling such that she is unable to wear her rings but this should continue to improve with time.  Recommend that she keep the hand protected when in the gym or doing other more active activities.  I Fabiola see her again in mid to late December before she goes on vacation.  I will defer recommendations based on the final pathology report to her dermatologist who she is scheduled to see next week.    Raymon Amador MD    [employer] [facility] [employer]  [employer]  Department of Orthopaedic Surgery  Chief of Hand and Upper Extremity Surgery  [facility]    Dictation performed with the use of voice recognition software. Syntax and grammatical errors may exist.

## 2023-11-08 NOTE — PROGRESS NOTES
History of Present Illness:  The patient presents today for evaluation of side: bilateral hand pain.  The patient endorses numbness and tingling (predominantly radial three digits).  There is no current neck pain or cervical spine issues.  The patient has tried to the following interventions thus far:  Activity modifications .  The patient notes the pain is worse with activity such as driving or talking on the phone and at night.  The patient denies any recent trauma.  The pain is sharp, electrical in nature.    Past Medical History:   Diagnosis Date    Abnormal weight gain 06/24/2022    Abnormal weight gain    Abrasion of unspecified wrist, initial encounter 09/21/2017    Cat scratch of wrist    Adjustment disorder with depressed mood 03/08/2017    Grieving    Allergic rhinitis, unspecified 08/25/2022    Allergic rhinitis    Allergy, unspecified, initial encounter 07/29/2022    Allergy    Cough, unspecified 09/10/2018    Cough    Disappearance and death of family member 10/06/2016    Sudden death of family member    Disorder of teeth and supporting structures, unspecified 02/22/2016    Dental disorder    Dorsalgia, unspecified 07/29/2022    Back pain    Encounter for gynecological examination (general) (routine) without abnormal findings 03/08/2017    Visit for gynecologic examination    Encounter for other screening for malignant neoplasm of breast 09/07/2022    Breast screening    Encounter for screening for malignant neoplasm of colon 09/07/2022    Colon cancer screening    Essential (primary) hypertension 11/09/2022    Hypertension    Gastro-esophageal reflux disease without esophagitis 09/07/2022    Gastroesophageal reflux disease    Obesity, unspecified 11/09/2022    Obesity    Otalgia, right ear 02/08/2016    Otalgia, right    Other conditions influencing health status 03/29/2017    History of cough    Other long term (current) drug therapy 11/09/2022    Medication management    Other specified soft tissue  disorders 02/08/2016    Swelling of left hand    Pain in right ankle and joints of right foot 03/08/2017    Right ankle pain    Pain in right foot 12/08/2016    Right foot pain    Pain in right hand 09/07/2022    Bilateral hand pain    Pain in right hand 04/16/2018    Bilateral hand pain    Pain in right knee 07/29/2022    Bilateral knee pain    Pain in right knee 03/07/2018    Right knee pain    Pain in right shoulder 03/12/2020    Bilateral shoulder pain    Pain in unspecified hand 09/07/2022    Pain, hand    Personal history of other diseases of the nervous system and sense organs 12/08/2016    History of sleep disturbance    Pruritus, unspecified 11/09/2022    Ear itching       Medication Documentation Review Audit       Reviewed by Hussein Ortiz LPN (Licensed Practical Nurse) on 11/07/23 at 0858      Medication Order Taking? Sig Documenting Provider Last Dose Status   acetaminophen (Tylenol 8 HOUR) 650 mg ER tablet [id number] No Take 1 tablet (650 mg) by mouth every 8 hours if needed for mild pain (1 - 3). Do not crush, chew, or split. Historical Provider, MD Taking Active   ascorbic acid/collagen hydr (COLLAGEN SKIN RENEWAL ORAL) [id number] No Take 1 capsule by mouth once daily. Historical Provider, MD Taking Active   cyanocobalamin (Vitamin B-12) 100 mcg tablet [id number] No Take 1 tablet (100 mcg) by mouth once daily. Historical Provider, MD Taking Active   Discontinued 11/06/23 1438   ergocalciferol (Vitamin D-2) 1.25 MG (32880 UT) capsule [id number]  TAKE 1 CAPSULE (08458 UNITS) BY MOUTH ONE TIME PER WEEK Ernestine Vivas MD  Active   lisinopriL-hydrochlorothiazide 10-12.5 mg tablet [id number] No Take 1 tablet by mouth once daily. Ernestine Vivas MD Taking Active   multivitamin capsule [id number] No Take 1 capsule by mouth once daily. Historical Provider, MD Taking Active   omega 3-dha-epa-fish oil 360 mg-108 mg- 180 mg-1,200 mg capsule [id number] No Take by mouth. Historical  Provider, MD Taking Active                    No Known Allergies    Social History     Socioeconomic History    Marital status: Single     Spouse name: Not on file    Number of children: Not on file    Years of education: Not on file    Highest education level: Not on file   Occupational History    Not on file   Tobacco Use    Smoking status: Former     Types: Cigarettes    Smokeless tobacco: Never   Substance and Sexual Activity    Alcohol use: Yes     Comment: rarely    Drug use: Not on file    Sexual activity: Not on file   Other Topics Concern    Not on file   Social History Narrative    Not on file     Social Determinants of Health     Financial Resource Strain: Not on file   Food Insecurity: Not on file   Transportation Needs: Not on file   Physical Activity: Not on file   Stress: Not on file   Social Connections: Not on file   Intimate Partner Violence: Not on file   Housing Stability: Not on file       Past Surgical History:   Procedure Laterality Date    BACK SURGERY  03/25/2015    Back Surgery    TOTAL KNEE ARTHROPLASTY  03/25/2015    Knee Replacement          No History of diabetes or hypothyroidism.     Review of Systems   GENERAL: Negative for malaise, significant weight loss, fever  MUSCULOSKELETAL: see HPI  NEURO:  Negative     Physical Examination:  No tenderness to palpation cervical spine  Good ROM of neck  Negative Spurlings test     side: bilateral wrist/hand:  No obvious swelling or masses  Thenar eminence muscle mass: thenar: none  No atrophy noted of hypothenar eminence   Equivocal Tinel's at carpal tunnel  + Median nerve compression test  + Cathy's test  Decreased sensation to light touch and 2 point discrimination in median nerve distribution  Motor exam within normal limits  Radial pulse palpable  Good capillary refill       Bilateral hand x-rays demonstrate mild CMC joint arthritis.  No other significant abnormalities identified.       Assessment:  Patient with side: bilateral carpal  tunnel syndrome     Plan:  We reviewed the disease process with the patient.  We discussed the role for electrodiagnostic testing and treatment decision making, immobilization, and injections.  We discussed surgical intervention reviewing the risks (neurologic injury, wound issues including infection, pillar pain, and recurrence) and benefits.  The patient elected for: bracing.    Patient was prescribed a carpal tunnel syndrome for carpal tunnel syndrome. The patient has weakness, instability and/or deformity of their bilateral wrists which requires stabilization from this orthosis to improve their function.      Verbal and written instructions for the use, wear schedule, cleaning and application of this item were given.  Patient was instructed that should the brace result in increased pain, decreased sensation, increased swelling, or an overall worsening of their medical condition, to please contact our office immediately.     Orthotic management and training was provided for skin care, modifications due to healing tissues, edema changes, interruption in skin integrity, and safety precautions with the orthosis.         Raymon Amador MD    [employer] [employer]  Department of Orthopaedic Surgery  Chief of Hand and Upper Extremity Surgery  [facility]    Dictation performed with the use of voice recognition software. Syntax and grammatical errors may exist.

## 2023-11-17 ENCOUNTER — APPOINTMENT (OUTPATIENT)
Dept: SURGERY | Facility: CLINIC | Age: 58
End: 2023-11-17
Payer: COMMERCIAL

## 2023-11-21 DIAGNOSIS — I10 HYPERTENSION, UNSPECIFIED TYPE: ICD-10-CM

## 2023-11-21 DIAGNOSIS — R94.31 ABNORMAL EKG: ICD-10-CM

## 2023-11-21 DIAGNOSIS — E66.01 MORBID (SEVERE) OBESITY DUE TO EXCESS CALORIES (MULTI): Primary | ICD-10-CM

## 2023-11-21 DIAGNOSIS — Z01.810 ENCOUNTER FOR PRE-OPERATIVE CARDIOVASCULAR CLEARANCE: ICD-10-CM

## 2023-11-27 ENCOUNTER — HOSPITAL ENCOUNTER (OUTPATIENT)
Dept: RADIOLOGY | Facility: HOSPITAL | Age: 58
Discharge: HOME | End: 2023-11-27
Payer: COMMERCIAL

## 2023-11-27 DIAGNOSIS — E66.01 MORBID (SEVERE) OBESITY DUE TO EXCESS CALORIES (MULTI): ICD-10-CM

## 2023-11-27 PROCEDURE — 71046 X-RAY EXAM CHEST 2 VIEWS: CPT

## 2023-11-27 PROCEDURE — 71046 X-RAY EXAM CHEST 2 VIEWS: CPT | Performed by: RADIOLOGY

## 2023-11-28 ENCOUNTER — HOSPITAL ENCOUNTER (OUTPATIENT)
Dept: CARDIOLOGY | Facility: HOSPITAL | Age: 58
Discharge: HOME | End: 2023-11-28
Payer: COMMERCIAL

## 2023-11-28 DIAGNOSIS — E66.01 MORBID (SEVERE) OBESITY DUE TO EXCESS CALORIES (MULTI): ICD-10-CM

## 2023-11-28 PROCEDURE — 93005 ELECTROCARDIOGRAM TRACING: CPT

## 2023-11-28 PROCEDURE — 93010 ELECTROCARDIOGRAM REPORT: CPT | Performed by: INTERNAL MEDICINE

## 2023-11-30 LAB
ATRIAL RATE: 94 BPM
P AXIS: 58 DEGREES
P OFFSET: 204 MS
P ONSET: 150 MS
PR INTERVAL: 142 MS
Q ONSET: 221 MS
QRS COUNT: 15 BEATS
QRS DURATION: 92 MS
QT INTERVAL: 356 MS
QTC CALCULATION(BAZETT): 445 MS
QTC FREDERICIA: 413 MS
R AXIS: 14 DEGREES
T AXIS: 16 DEGREES
T OFFSET: 399 MS
VENTRICULAR RATE: 94 BPM

## 2023-12-20 ENCOUNTER — TELEPHONE (OUTPATIENT)
Dept: PRIMARY CARE | Facility: CLINIC | Age: 58
End: 2023-12-20
Payer: COMMERCIAL

## 2023-12-20 NOTE — TELEPHONE ENCOUNTER
Called Pt and relayed MD info.  Gave her  number.    FYI they took her mother to the ED.  She was having breathing problems.  She is stable for now according to Lenoarda.  LG      ----- Message from Ernestine Vivas MD sent at 12/19/2023  5:37 PM EST -----  Pls let her know , will put in a consult to cardiology for presurgery .  Ty aq

## 2024-01-03 ENCOUNTER — APPOINTMENT (OUTPATIENT)
Dept: CARDIOLOGY | Facility: HOSPITAL | Age: 59
End: 2024-01-03
Payer: COMMERCIAL

## 2024-01-04 ENCOUNTER — APPOINTMENT (OUTPATIENT)
Dept: CARDIOLOGY | Facility: CLINIC | Age: 59
End: 2024-01-04
Payer: COMMERCIAL

## 2024-01-16 PROBLEM — K29.70 GASTRITIS: Status: ACTIVE | Noted: 2023-08-07

## 2024-01-16 PROBLEM — R05.9 COUGH: Status: ACTIVE | Noted: 2024-01-16

## 2024-01-16 PROBLEM — I45.10 INCOMPLETE RIGHT BUNDLE BRANCH BLOCK (RBBB): Status: ACTIVE | Noted: 2023-07-20

## 2024-01-16 PROBLEM — J30.9 ALLERGIC RHINITIS: Status: ACTIVE | Noted: 2024-01-16

## 2024-01-16 PROBLEM — K64.9 HEMORRHOIDS: Status: ACTIVE | Noted: 2023-07-20

## 2024-01-16 PROBLEM — F19.21 HISTORY OF SUBSTANCE DEPENDENCE (MULTI): Status: ACTIVE | Noted: 2023-08-28

## 2024-01-16 PROBLEM — L29.9 ITCHING OF EAR: Status: ACTIVE | Noted: 2024-01-16

## 2024-01-16 PROBLEM — R20.0 NUMBNESS OF FINGER: Status: ACTIVE | Noted: 2024-01-16

## 2024-01-16 PROBLEM — D12.5 BENIGN NEOPLASM OF SIGMOID COLON: Status: ACTIVE | Noted: 2023-07-20

## 2024-01-16 PROBLEM — N63.15 UNSPECIFIED LUMP IN THE RIGHT BREAST, OVERLAPPING QUADRANTS: Status: ACTIVE | Noted: 2023-08-08

## 2024-01-16 PROBLEM — R53.83 FATIGUE: Status: ACTIVE | Noted: 2024-01-16

## 2024-01-16 PROBLEM — M25.519 SHOULDER PAIN: Status: ACTIVE | Noted: 2024-01-16

## 2024-01-16 PROBLEM — M54.9 BACK PAIN: Status: ACTIVE | Noted: 2024-01-16

## 2024-01-16 PROBLEM — E55.9 VITAMIN D DEFICIENCY: Status: ACTIVE | Noted: 2023-07-20

## 2024-01-16 PROBLEM — R12 HEARTBURN: Status: ACTIVE | Noted: 2023-08-07

## 2024-01-16 PROBLEM — M79.642 PAIN OF LEFT HAND: Status: ACTIVE | Noted: 2024-01-16

## 2024-01-16 PROBLEM — K08.9 DISORDER OF TEETH AND SUPPORTING STRUCTURES: Status: ACTIVE | Noted: 2023-09-08

## 2024-01-16 PROBLEM — H90.3 SENSORINEURAL HEARING LOSS (SNHL) OF BOTH EARS: Status: ACTIVE | Noted: 2022-12-21

## 2024-01-16 PROBLEM — M79.89 SWELLING OF HAND: Status: ACTIVE | Noted: 2024-01-16

## 2024-01-16 PROBLEM — Q67.5 CONGENITAL MUSCULOSKELETAL DEFORMITY OF SPINE: Status: ACTIVE | Noted: 2024-01-16

## 2024-01-16 PROBLEM — K63.5 POLYP OF COLON: Status: ACTIVE | Noted: 2023-07-20

## 2024-01-16 PROBLEM — M25.579 ANKLE PAIN: Status: ACTIVE | Noted: 2024-01-16

## 2024-01-16 PROBLEM — Z98.84 BARIATRIC SURGERY STATUS: Status: ACTIVE | Noted: 2023-08-28

## 2024-01-16 PROBLEM — N95.1 MENOPAUSAL SYMPTOM: Status: ACTIVE | Noted: 2024-01-16

## 2024-01-16 PROBLEM — N64.89 BREAST ASYMMETRY: Status: ACTIVE | Noted: 2023-08-08

## 2024-01-16 PROBLEM — K44.9 DIAPHRAGMATIC HERNIA: Status: ACTIVE | Noted: 2023-08-07

## 2024-01-16 PROBLEM — N63.25 UNSPECIFIED LUMP IN THE LEFT BREAST, OVERLAPPING QUADRANTS: Status: ACTIVE | Noted: 2023-08-08

## 2024-01-16 PROBLEM — H92.01 OTALGIA, RIGHT EAR: Status: ACTIVE | Noted: 2024-01-16

## 2024-01-16 PROBLEM — K92.9 DISORDER OF UPPER GASTROINTESTINAL TRACT: Status: ACTIVE | Noted: 2023-08-07

## 2024-01-16 RX ORDER — NAPROXEN 500 MG/1
500 TABLET ORAL 2 TIMES DAILY
COMMUNITY
Start: 2014-02-14 | End: 2024-01-17 | Stop reason: WASHOUT

## 2024-01-16 RX ORDER — FLUTICASONE PROPIONATE 50 MCG
SPRAY, SUSPENSION (ML) NASAL
COMMUNITY
Start: 2020-06-02 | End: 2024-01-17 | Stop reason: WASHOUT

## 2024-01-16 RX ORDER — IBUPROFEN 800 MG/1
800 TABLET ORAL 3 TIMES DAILY PRN
COMMUNITY
Start: 2023-12-23 | End: 2024-02-29 | Stop reason: ALTCHOICE

## 2024-01-16 RX ORDER — DOCUSATE SODIUM 100 MG/1
100 CAPSULE, LIQUID FILLED ORAL 2 TIMES DAILY
COMMUNITY
Start: 2013-11-22 | End: 2024-02-12 | Stop reason: ALTCHOICE

## 2024-01-16 RX ORDER — CLOBETASOL PROPIONATE 0.46 MG/ML
SOLUTION TOPICAL
COMMUNITY
Start: 2022-12-21 | End: 2024-01-17 | Stop reason: WASHOUT

## 2024-01-16 RX ORDER — AMOXICILLIN 500 MG/1
500 CAPSULE ORAL
COMMUNITY
Start: 2023-12-23 | End: 2024-01-17 | Stop reason: WASHOUT

## 2024-01-16 RX ORDER — AMOXICILLIN 500 MG/1
TABLET, FILM COATED ORAL
COMMUNITY
Start: 2023-10-17 | End: 2024-01-17 | Stop reason: WASHOUT

## 2024-01-16 RX ORDER — AZELASTINE HYDROCHLORIDE 0.5 MG/ML
1 SOLUTION/ DROPS OPHTHALMIC 2 TIMES DAILY
COMMUNITY
Start: 2014-03-01 | End: 2024-01-17 | Stop reason: WASHOUT

## 2024-01-16 NOTE — PROGRESS NOTES
Location of visit: [facility]   Type of Visit: Established    Chief Complaint  Patient was referred to Cardiology by Dr. Ilda arcos. provider found for No chief complaint on file..    History Of Present Illness:    Leonarda Quevedo is a 58 y.o. female, with history significant for HTN, incomplete RBBB, prior smoking, BMI 40.8, VINCE, metabolic syndrome, GERD, diaphragmatic hernia, and low vitamin D, who visits Cardiology today as a follow-up  for bariatric surgery clearance.    She exercises routinely and can climb 2 flights of stairs w/o problems other than knee pain. She is asymptomatic from the cardiac stand point and denies chest pain, SANTILLAN, orthopnea, NPD, nocturia, palpitations, dizziness, syncope or claudication. Her HTN is well controlled.      Her EKG shows IRBB plus negative T waves in V1 and V3 with nonspecific repolarization abnormalities in the other precordial leads. No prior tracings to compare.      Her most recent lipid panel from 7/2023 showed borderline cholesterol with mildly elevated LDL (125). CMP showed an elevated total calcium but when corrected by albuminemia is within normal range.     She underwent a transthoracic echocardiogram on 9/2023 which showed normal LV anatomy, normal-high EF (70-75%), with no regional wall motion abnormalities.    Reports significant stress due to mom with ESRD with long hospitalization during the holidays.    Today's ECG shows sinus rhythm at 73 bpm, normal AV conduction, incomplete RBBB, and normal ventricular repolarization.    Blood pressure today: 123/85 mmHg.    Past Medical History:  She has a past medical history of HTN, incomplete RBBB, prior smoking, BMI 40.8, VINCE, metabolic syndrome, GERD, diaphragmatic hernia, and low vitamin D.    Past Surgical History:  She has a past surgical history that includes Back surgery (03/25/2015) and Total knee arthroplasty (03/25/2015), scoliosis, hip surgery and bunionectomy.    Social History:  She reports that she has  quit smoking. Her smoking use included cigarettes. She has never used smokeless tobacco. She reports current alcohol use. No history on file for drug use.    Family History   Problem Relation Name Age of Onset    Hypertension Mother      Diabetes Mother      Hypertension Father      Diabetes Father      Diabetes Brother       Allergies:  Patient has no known allergies.    Outpatient Medications:  Current Outpatient Medications   Medication Instructions    acetaminophen (TYLENOL 8 HOUR) 650 mg, oral, Every 8 hours PRN, Do not crush, chew, or split.    amoxicillin (Amoxil) 500 mg tablet TAKE 1 TABLET BY MOUTH 3 TIMES A DAY UNTIL GONE    amoxicillin (AMOXIL) 500 mg, oral, Until gone    ascorbic acid/collagen hydr (COLLAGEN SKIN RENEWAL ORAL) 1 capsule, oral, Daily    azelastine (Optivar) 0.05 % ophthalmic solution 1 drop, ophthalmic (eye), 2 times daily    clobetasol (Temovate) 0.05 % external solution Clobetasol Propionate 0.05 % External Solution 3-4 drops to affected ear BID x 7 days, then use as needed for itching for 3 days Quantity: 1 Refills: 1 Ordered: 21-Dec-2022 Means SAMANTHA-Nicole ODONNELL Start : 21-Dec-2022 Active    cyanocobalamin (VITAMIN B-12) 100 mcg, oral, Daily    docusate sodium (COLACE) 100 mg, oral, 2 times daily    ergocalciferol (VITAMIN D-2) 50,000 Units, oral, Weekly    fluticasone (Flonase) 50 mcg/actuation nasal spray nasal    ibuprofen 800 mg, oral, 3 times daily PRN    lisinopriL-hydrochlorothiazide 10-12.5 mg tablet 1 tablet, oral, Daily    multivitamin capsule 1 capsule, oral, Daily    naproxen (NAPROSYN) 500 mg, oral, 2 times daily    omega 3-dha-epa-fish oil 360 mg-108 mg- 180 mg-1,200 mg capsule oral     Last Recorded Vitals:  There were no vitals filed for this visit.  Physical Exam:      11/7/2023     8:56 AM 11/2/2023    11:38 AM 10/12/2023     1:04 PM 10/10/2023    10:05 AM 7/20/2023     2:08 PM 7/20/2023     8:59 AM   Vitals   Systolic   105   118   Diastolic   68   80   Heart Rate     "  77   Height (in) 1.626 m (5' 4\") 1.651 m (5' 5\")  1.651 m (5' 5\") 1.651 m (5' 5\") 1.651 m (5' 5\")   Weight (lb) 239 239 234 242 244.93 245.5   BMI 41.02 kg/m2 39.77 kg/m2 38.94 kg/m2 40.27 kg/m2 40.76 kg/m2 40.85 kg/m2   BSA (m2) 2.21 m2 2.23 m2 2.2 m2 2.25 m2 2.26 m2 2.26 m2   Visit Report Report  Report        Wt Readings from Last 5 Encounters:   11/07/23 108 kg (239 lb)   11/02/23 108 kg (239 lb)   10/12/23 106 kg (234 lb)   10/10/23 110 kg (242 lb)   07/20/23 111 kg (245 lb 8 oz)     General: Sitting up comfortably in chair; in no apparent distress.  HEENT: Normocephalic; atraumatic. Well hydrated.  Eyes: Anicteric sclera. Extraocular movement intact.  Neck: Supple; no thyromegaly; normal jugular venous pressure, no bruits.  Respiratory: Bilateral air entry equal. No wheezing.  Cardiovascular: Normal S1, S2; no murmurs auscultated.  Abdomen: Nondistended; nontender. (+) bowel sounds.  Extremities: No peripheral edema present. Pulses 2+ diffusely.  Neurological: Oriented to time, place, and person; nonfocal.  Psychiatric: Normal affect.     Last Labs Reviewed:  CBC -  Recent Labs     08/30/23  1436 06/24/22  1515   WBC 6.7 8.7   HGB 12.5 13.4   HCT 38.4 42.1    290   MCV 87 86     CMP -  Recent Labs     08/30/23  1436 07/13/23  1422 06/24/22  1515 03/22/19  1317 09/10/18  0935    140 139 140 136   K 4.1 4.2 4.5 3.8 4.0    99 100 101 99   CO2 30 31 30 31 28   ANIONGAP 13 14 14 12 13   BUN 22 17 14 20 18   CREATININE 0.65 0.74 0.74 0.70 0.71     Recent Labs     08/30/23  1436 07/13/23  1422 06/24/22  1515 03/22/19  1317 09/10/18  0935   ALBUMIN 4.4 4.7 4.4 4.3 4.1   ALKPHOS 64 67 66 55 46   ALT 23 19 28 15 11   AST 17 17 23 20 16   BILITOT 0.7 0.6 0.4 0.5 0.4     LIPID PANEL -   Recent Labs     08/30/23  1436 07/13/23  1422   CHOL 185 200*   LDLF 115* 125*   HDL 45.3 45.6   TRIG 123 146     COAGULATION PANEL -  Recent Labs     08/30/23  1436   INR 1.0     HEME/ENDO -  Recent Labs     " "08/30/23  1436 07/13/23  1422 06/24/22  1515 09/10/18  0935   FERRITIN 171*  --   --   --    IRONSAT 27  --   --   --    TSH 1.05 1.03 1.42 1.02   HGBA1C 6.2*  --   --   --       CARDIOVASCULAR  No results for input(s): \"LDH\", \"CKMB\", \"TROPHS\", \"BNP\", \"CRP\" in the last 33733 hours.    No lab exists for component: \"CK\", \"CKMBP\", \"CRPUS\", \"USCRP\"  Last Cardiology/Imaging Tests Personally Reviewed (if images available) and Interpreted:  ECG:  Encounter Date: 11/28/23   ECG 12 Lead   Result Value    Ventricular Rate 94    Atrial Rate 94    OR Interval 142    QRS Duration 92    QT Interval 356    QTC Calculation(Bazett) 445    P Axis 58    R Axis 14    T Axis 16    QRS Count 15    Q Onset 221    P Onset 150    P Offset 204    T Offset 399    QTC Fredericia 413    Narrative    Normal sinus rhythm  Incomplete right bundle branch block  Cannot rule out Inferior infarct , age undetermined  Abnormal ECG  When compared with ECG of 20-JUL-2023 09:03,  Incomplete right bundle branch block is now Present  Confirmed by Andrez Shaw ([id number]) on 11/30/2023 9:58:19 AM     ECG 12 Lead 7/20/2023  Sinus rhythm at 74 bpm, normal AV conduction, incomplete RBBB and TWI in V1-V4    Echocardiogram:  Echocardiogram 9/18/2023     Patient Name:     DEX Garcia Physician:   [id number] Tisha Olson MD  Study Date:       9/18/2023    Referring Physician: TERA DIETZ  MRN/PID:          [id number]     PCP:  Accession/Order#: [id number] Department Location: [facility]  YOB: 1965     Fellow:  Gender:           F            Nurse:  Admit Date:                    Sonographer:         Bonnie Chiang RDCS  Admission Status: Outpatient   Additional Staff:  Height:           165.10 cm    CC Report to:  Weight:           112.04 kg    Study Type:          Echocardiogram  BSA:              2.16 m2  Blood Pressure: 89 /55 mmHg    Diagnosis/ICD: R94.31-Abnormal electrocardiogram [ECG] [EKG]  Indication:    Abnormal EKG; " Preop examination  Procedure/CPT: Echo Complete w Full Doppler-[id number]    Patient History:  Pertinent History: HTN. RBBB; Abn EKG.    PHYSICIAN INTERPRETATION:  Left Ventricle: The left ventricular systolic function is hyperdynamic, with an estimated ejection fraction of 70-75%. There are no regional wall motion abnormalities. The left ventricular cavity size is normal. Spectral Doppler shows a normal pattern of left ventricular diastolic filling.  Left Atrium: The left atrium is normal in size.  Right Ventricle: The right ventricle is normal in size. There is normal right ventricular global systolic function.  Right Atrium: The right atrium is normal in size.  Aortic Valve: The aortic valve appears structurally normal. There is minimal aortic valve cusp calcification. There is no evidence of aortic valve regurgitation. The peak instantaneous gradient of the aortic valve is 7.8 mmHg.  Mitral Valve: The mitral valve is normal in structure. There is trace mitral valve regurgitation.  Tricuspid Valve: The tricuspid valve is structurally normal. There is trace tricuspid regurgitation. The Doppler estimated RVSP is within normal limits at 24.8 mmHg.  Pulmonic Valve: The pulmonic valve is structurally normal. There is physiologic pulmonic valve regurgitation.  Pericardium: There is no pericardial effusion noted.  Aorta: The aortic root is normal.  Systemic Veins: The inferior vena cava appears to be of normal size. There is IVC inspiratory collapse greater than 50%.  In comparison to the previous echocardiogram(s): There are no prior studies on this patient for comparison purposes.    CONCLUSIONS:  1. Left ventricular systolic function is hyperdynamic with a 70-75% estimated ejection fraction.  2. RVSP within normal limits.    Cath:  No results found.    Stress Test:  No results found.    Cardiac CT/MRI:  No results found.    Other CT:  No results found.    CV RISK FACTORS:   # Hypertension: Last BP:  .  # Hyperlipidemia:  Last Tchol 185 /  / HDL 45.3 / TRIG 123 (8/30/2023:  2:36 PM).  # Type II Diabetes Mellitus: Last A1c 6.2 (8/30/2023:  2:36 PM).  # Obesity: Last BMI:  .  # CKD: Last BUN/Cr (GFR): 22/0.65 (No results found for requested labs within last 365 days.), 8/30/2023:  2:36 PM.    ASCV RISK:  The 10-year ASCVD risk score (Sy ZHU, et al., 2019) is: 3%    Values used to calculate the score:      Age: 58 years      Sex: Female      Is Non- : Yes      Diabetic: No      Tobacco smoker: No      Systolic Blood Pressure: 105 mmHg      Is BP treated: Yes      HDL Cholesterol: 45.3 mg/dL      Total Cholesterol: 185 mg/dL    Assessment/Plan   58 y.o. female, with history significant for HTN, incomplete RBBB, prior smoking, BMI 40.8, VINCE, metabolic syndrome, GERD, diaphragmatic hernia, and low vitamin D, who visits Cardiology today as a follow-up  for bariatric surgery clearance. She had EKG abnormalities but underwent transthoracic echocardiogram which ruled out LV abnormalities. The LVEF was in the high-normal range but probably explained by increased BMI and thiazide usage. She has no contraindication for bariatric surgery with general anesthesia with a predicted periop mortality of <0.1%.     Recommendations:  - Bariatric surgery clearance: patient was cleared for surgery  - HLD: not on statin, she was recommended a coronary calcium score to define treatment and goals  - HTN: well controlled, will continue actual treatment  - I will contact the patient once the results are available through el?  - I spent 40 minutes in this assessment including pre-charting, face-to-face interaction with patient, and documentation    Wes Edmonds MD

## 2024-01-17 ENCOUNTER — OFFICE VISIT (OUTPATIENT)
Dept: CARDIOLOGY | Facility: HOSPITAL | Age: 59
End: 2024-01-17
Payer: COMMERCIAL

## 2024-01-17 VITALS
HEART RATE: 74 BPM | BODY MASS INDEX: 40.22 KG/M2 | OXYGEN SATURATION: 99 % | SYSTOLIC BLOOD PRESSURE: 123 MMHG | WEIGHT: 241.4 LBS | DIASTOLIC BLOOD PRESSURE: 85 MMHG | HEIGHT: 65 IN

## 2024-01-17 DIAGNOSIS — I10 HYPERTENSION, UNSPECIFIED TYPE: ICD-10-CM

## 2024-01-17 DIAGNOSIS — R93.1 ABNORMAL COMPUTED TOMOGRAPHY ANGIOGRAPHY OF HEART: ICD-10-CM

## 2024-01-17 DIAGNOSIS — Z01.810 PRE-OPERATIVE CARDIOVASCULAR EXAMINATION: Primary | ICD-10-CM

## 2024-01-17 DIAGNOSIS — E78.49 OTHER HYPERLIPIDEMIA: ICD-10-CM

## 2024-01-17 PROCEDURE — 99204 OFFICE O/P NEW MOD 45 MIN: CPT | Performed by: STUDENT IN AN ORGANIZED HEALTH CARE EDUCATION/TRAINING PROGRAM

## 2024-01-17 PROCEDURE — 3008F BODY MASS INDEX DOCD: CPT | Performed by: STUDENT IN AN ORGANIZED HEALTH CARE EDUCATION/TRAINING PROGRAM

## 2024-01-17 PROCEDURE — 3074F SYST BP LT 130 MM HG: CPT | Performed by: STUDENT IN AN ORGANIZED HEALTH CARE EDUCATION/TRAINING PROGRAM

## 2024-01-17 PROCEDURE — 3079F DIAST BP 80-89 MM HG: CPT | Performed by: STUDENT IN AN ORGANIZED HEALTH CARE EDUCATION/TRAINING PROGRAM

## 2024-01-17 PROCEDURE — 93005 ELECTROCARDIOGRAM TRACING: CPT | Performed by: STUDENT IN AN ORGANIZED HEALTH CARE EDUCATION/TRAINING PROGRAM

## 2024-01-17 PROCEDURE — 93010 ELECTROCARDIOGRAM REPORT: CPT | Performed by: INTERNAL MEDICINE

## 2024-01-17 PROCEDURE — 99214 OFFICE O/P EST MOD 30 MIN: CPT | Performed by: STUDENT IN AN ORGANIZED HEALTH CARE EDUCATION/TRAINING PROGRAM

## 2024-01-17 RX ORDER — METOPROLOL TARTRATE 50 MG/1
50 TABLET ORAL ONCE
Qty: 1 TABLET | Refills: 0 | Status: SHIPPED | OUTPATIENT
Start: 2024-01-17 | End: 2024-01-17

## 2024-01-17 ASSESSMENT — LIFESTYLE VARIABLES
SKIP TO QUESTIONS 9-10: 0
HOW MANY STANDARD DRINKS CONTAINING ALCOHOL DO YOU HAVE ON A TYPICAL DAY: 1 OR 2
HOW OFTEN DO YOU HAVE A DRINK CONTAINING ALCOHOL: 2-3 TIMES A WEEK
HOW OFTEN DO YOU HAVE SIX OR MORE DRINKS ON ONE OCCASION: LESS THAN MONTHLY
AUDIT-C TOTAL SCORE: 4

## 2024-01-17 ASSESSMENT — PAIN SCALES - GENERAL: PAINLEVEL: 0-NO PAIN

## 2024-01-17 NOTE — PATIENT INSTRUCTIONS
Dear Leonarda Quevedo,    It was a pleasure meeting you today at the Cardiology office. As we dicussed, your clinical condition is preop clearance for bariatric surgery. You echocardiogram showed normal anatomy and normal-high function. I recommended keeping a good hydration during the day and working on your legs muscle mass. I also recommended a coronary calcium score as cardiovascular prevention.  I will contact you through [employer] once your results are available.    Sincerely,     Wes Edmonds MD

## 2024-01-19 ENCOUNTER — APPOINTMENT (OUTPATIENT)
Dept: CARDIOLOGY | Facility: HOSPITAL | Age: 59
End: 2024-01-19
Payer: COMMERCIAL

## 2024-01-29 LAB
ATRIAL RATE: 73 BPM
P AXIS: 56 DEGREES
P OFFSET: 202 MS
P ONSET: 141 MS
PR INTERVAL: 158 MS
Q ONSET: 220 MS
QRS COUNT: 12 BEATS
QRS DURATION: 98 MS
QT INTERVAL: 412 MS
QTC CALCULATION(BAZETT): 453 MS
QTC FREDERICIA: 440 MS
R AXIS: 58 DEGREES
T AXIS: 57 DEGREES
T OFFSET: 426 MS
VENTRICULAR RATE: 73 BPM

## 2024-02-02 ENCOUNTER — HOSPITAL ENCOUNTER (OUTPATIENT)
Dept: RADIOLOGY | Facility: HOSPITAL | Age: 59
Discharge: HOME | End: 2024-02-02
Payer: COMMERCIAL

## 2024-02-02 ENCOUNTER — APPOINTMENT (OUTPATIENT)
Dept: CARDIOLOGY | Facility: HOSPITAL | Age: 59
End: 2024-02-02
Payer: COMMERCIAL

## 2024-02-02 ENCOUNTER — NUTRITION (OUTPATIENT)
Dept: SURGERY | Facility: CLINIC | Age: 59
End: 2024-02-02
Payer: COMMERCIAL

## 2024-02-02 VITALS — BODY MASS INDEX: 39.65 KG/M2 | HEIGHT: 65 IN | WEIGHT: 238 LBS

## 2024-02-02 DIAGNOSIS — E66.01 MORBID OBESITY WITH BMI OF 40.0-44.9, ADULT (MULTI): Primary | ICD-10-CM

## 2024-02-02 DIAGNOSIS — Z01.810 PRE-OPERATIVE CARDIOVASCULAR EXAMINATION: ICD-10-CM

## 2024-02-02 PROCEDURE — 75571 CT HRT W/O DYE W/CA TEST: CPT

## 2024-02-02 NOTE — PROGRESS NOTES
"PREOPERATIVE, MULTIDISCIPLINARY, MEDICALLY SUPERVISED, REDUCED CALORIE DIET, BEHAVIOR MODIFICATION AND EXERCISE PROGRAM    S:  The patient is scheduled for gastric sleeve on 3/13/224.  She will start the the 2 week pre-op diet on 2/29/24 and will have Premier protein shake for breakfast and lunch with the option of snacking on cooked/raw vegetables without added fats.         She will have a frozen light meal with 15 grams protein and 300 calories for dinner and considering making prepared meals. for an alternative, with 4 oz. lean protein, 1/2c. cooked starch and vegetables.  Drinking 64 ounces of water and calorie free, non-carbonated and decaffeinated fluids daily and working on walking regularly.      O:      Vitals:    02/02/24 1009   Weight: 108 kg (238 lb)      Ht:  1.638 m (5' 4.5\")   BMI:  Body mass index is 40.22 kg/m².     Dietary recommendation:   1. Start the 2 week pre-op diet on 2/29/24.  2. Continue to practice the 30-30-30 rule by drinking between meals.  3. Discontinue taking your MVI 1 week before surgery.  4. The day before surgery, drink clear fluids only.  No shakes and no red dyes.    A/P: Pt appears to understand the 2 week pre-op diet.   Reviewed post-op vit/mineral supplements and post-op full liquid diet.    Follow-up 1-2 week post-op.  Patient will email me with any questions.     Tonya Gonzalez, RD, LD  "

## 2024-02-06 ENCOUNTER — HOSPITAL ENCOUNTER (OUTPATIENT)
Dept: RADIOLOGY | Facility: HOSPITAL | Age: 59
Discharge: HOME | End: 2024-02-06
Payer: COMMERCIAL

## 2024-02-06 ENCOUNTER — ANCILLARY ORDERS (OUTPATIENT)
Dept: SCHEDULING | Age: 59
End: 2024-02-06
Payer: COMMERCIAL

## 2024-02-06 DIAGNOSIS — N63.20 UNSPECIFIED LUMP IN THE LEFT BREAST, UNSPECIFIED QUADRANT: ICD-10-CM

## 2024-02-06 DIAGNOSIS — N63.20 UNSPECIFIED LUMP IN THE LEFT BREAST, UNSPECIFIED QUADRANT: Primary | ICD-10-CM

## 2024-02-06 PROCEDURE — 76982 USE 1ST TARGET LESION: CPT | Mod: LT

## 2024-02-06 PROCEDURE — 76642 ULTRASOUND BREAST LIMITED: CPT | Mod: LT

## 2024-02-06 PROCEDURE — 76642 ULTRASOUND BREAST LIMITED: CPT | Mod: LEFT SIDE | Performed by: RADIOLOGY

## 2024-02-12 ENCOUNTER — TELEPHONE (OUTPATIENT)
Dept: PRIMARY CARE | Facility: CLINIC | Age: 59
End: 2024-02-12
Payer: COMMERCIAL

## 2024-02-12 NOTE — TELEPHONE ENCOUNTER
Called Pt and relayed info.  Pt says she will make appts her self: she will call .  LG        ----- Message from Ernestine Vivas MD sent at 2/12/2024  2:05 PM EST -----  Pls let pt know , see pulmonary also for findings on cardiac ct , will need a recheck scan end of May . Order is in pls assist ty aq

## 2024-02-28 ENCOUNTER — TELEPHONE (OUTPATIENT)
Dept: SURGERY | Facility: CLINIC | Age: 59
End: 2024-02-28
Payer: COMMERCIAL

## 2024-02-28 PROBLEM — R06.83 SNORING: Status: RESOLVED | Noted: 2023-09-08 | Resolved: 2024-02-28

## 2024-02-28 PROBLEM — E66.01 MORBID (SEVERE) OBESITY DUE TO EXCESS CALORIES (MULTI): Status: RESOLVED | Noted: 2023-11-21 | Resolved: 2024-02-28

## 2024-02-28 NOTE — H&P (VIEW-ONLY)
BARIATRIC SURGERY PREOPERATIVE VISIT    Date: 02/28/24  Time: [unfilled]    Name: Leonarda Quevedo    MRN: [id number]    This is a 58 y.o. y.o. female with morbid obesity her BMI is 41.24 and she weighs 244 pounds with a height of 5 5.  Who plans to undergo Laparoscopic sleeve gastrectomy  39021 and Hiatal hernia repair 81549 surgery. They have completed a rigorous preoperative medical work-up and bariatric surgery educational program.  I discussed surgical options again with the patient and she is ready to proceed with a sleeve gastrectomy.  The patient has met all her milestones and will be undergoing her surgery on 3/13.     PMH:   Patient Active Problem List   Diagnosis    Abnormal mammogram    Abnormal weight gain    Gastroesophageal reflux disease    Hypertension    Psychological factors affecting morbid obesity (CMS/HCC)    VINCE (obstructive sleep apnea)    Morbid obesity with BMI of 40.0-44.9, adult (CMS/HCC)    Left breast mass    Disturbance in sleep behavior    Chronic eczematous otitis externa of both ears    Abnormal EKG    Pre-operative cardiovascular examination    Cough    Gastritis    Vitamin D deficiency    Swelling of hand    Shoulder pain    Polyp of colon    Bariatric surgery status    Diaphragmatic hernia    Disorder of upper gastrointestinal tract    Fatigue    Heartburn    Hemorrhoids    Incomplete right bundle branch block (RBBB)    Itching of ear    Menopausal symptom    Numbness of finger    Metabolic syndrome    Pain of left hand    Sensorineural hearing loss (SNHL) of both ears    Benign neoplasm of sigmoid colon    Otalgia, right ear    Congenital musculoskeletal deformity of spine    Allergic rhinitis    Ankle pain    Back pain    Unspecified lump in the left breast, overlapping quadrants    Unspecified lump in the right breast, overlapping quadrants    History of substance dependence (CMS/HCC)    Breast asymmetry    Disorder of teeth and supporting structures    Other hyperlipidemia       PSH:    Past Surgical History:   Procedure Laterality Date    BACK SURGERY  03/25/2015    Back Surgery    TOTAL KNEE ARTHROPLASTY  03/25/2015    Knee Replacement       Social hx:   Social History     Socioeconomic History    Marital status: Single     Spouse name: Not on file    Number of children: Not on file    Years of education: Not on file    Highest education level: Not on file   Occupational History    Not on file   Tobacco Use    Smoking status: Every Day     Packs/day: .25     Types: Cigarettes    Smokeless tobacco: Never   Substance and Sexual Activity    Alcohol use: Yes     Comment: rarely    Drug use: Not on file    Sexual activity: Not on file   Other Topics Concern    Not on file   Social History Narrative    Not on file     Social Determinants of Health     Financial Resource Strain: Not on file   Food Insecurity: Not on file   Transportation Needs: Not on file   Physical Activity: Not on file   Stress: Not on file   Social Connections: Not on file   Intimate Partner Violence: Not on file   Housing Stability: Not on file       Initial weight: 246  Current weight: There were no vitals filed for this visit.    Preop Clearances:  Cardiac: Cleared  Pulmonary: Cleared  Psych: Cleared    History of Clotting Disorder: Denies personal or family history of blood clots or blood clotting disorder  Anticoagulation plan: subcutaneous heparin while admitted, encouraged hourly ambulation and adequate hydration post op      Sleep Study: Moderate apnea and Compliant with CPAP: 73%    Endoscopy 8/7/2023:   Findings:       The Z-line was irregular and was found 40 cm from the incisors. Biopsies        were taken with a cold forceps for histology. Estimated blood loss was        minimal.       A 3 cm hiatal hernia was present.       The gastroesophageal flap valve was visualized endoscopically and        classified as Hill Grade III (minimal fold, loose to endoscope, hiatal        hernia likely).       Localized mildly  erythematous mucosa without bleeding was found in the        prepyloric region of the stomach. Biopsies were taken with a cold        forceps for Helicobacter pylori testing. Estimated blood loss was        minimal.       One 2 mm sessile polyp with no bleeding was found in the ampulla. The        polyp was removed with a cold biopsy forceps. Resection and retrieval        were complete. Estimated blood loss was minimal.       The duodenal bulb, first portion of the duodenum and second portion of        the duodenum were normal.  Estimated Blood Loss:       Estimated blood loss was minimal.  Impression:            - Z-line irregular, 40 cm from the incisors. Biopsied.                         - 3 cm hiatal hernia.                         - Gastroesophageal flap valve classified as Hill Grade                          III (minimal fold, loose to endoscope, hiatal hernia                          likely).                         - Erythematous mucosa in the prepyloric region of the                          stomach. Biopsied.                         - One duodenal polyp. Resected and retrieved.                         - Normal duodenal bulb, first portion of the duodenum     Preadmission testing date: 2/29/24 @ 0930    MEDICATIONS:  Prior to Admission Medications:    Current Outpatient Medications:     acetaminophen (Tylenol 8 HOUR) 650 mg ER tablet, Take 1 tablet (650 mg) by mouth every 8 hours if needed for mild pain (1 - 3). Do not crush, chew, or split., Disp: , Rfl:     ascorbic acid/collagen hydr (COLLAGEN SKIN RENEWAL ORAL), Take 1 capsule by mouth once daily., Disp: , Rfl:     cyanocobalamin (Vitamin B-12) 100 mcg tablet, Take 1 tablet (100 mcg) by mouth once daily., Disp: , Rfl:     ergocalciferol (Vitamin D-2) 1.25 MG (56722 UT) capsule, TAKE 1 CAPSULE (87195 UNITS) BY MOUTH ONE TIME PER WEEK, Disp: 12 capsule, Rfl: 0    ibuprofen 800 mg tablet, Take 1 tablet (800 mg) by mouth 3 times a day as needed., Disp: ,  Rfl:     lisinopriL-hydrochlorothiazide 10-12.5 mg tablet, Take 1 tablet by mouth once daily., Disp: 90 tablet, Rfl: 1    metoprolol tartrate (Lopressor) 50 mg tablet, Take 1 tablet by mouth 1 time for 1 dose. Take tablet 2 hours before coronary CT scan, Disp: 1 tablet, Rfl: 0    multivitamin capsule, Take 1 capsule by mouth once daily., Disp: , Rfl:     ALLERGIES:  No Known Allergies    REVIEW OF SYSTEMS:  GENERAL: Obese. Negative for malaise, significant weight loss and fever  NECK: Negative for lumps, goiter, pain and significant neck swelling  RESPIRATORY: Negative for cough, wheezing or shortness of breath.  CARDIOVASCULAR: Negative for chest pain, leg swelling or palpitations.  GI: Negative for abdominal discomfort, blood in stools or black stools or change in bowel habits  : No history of dysuria, frequency or incontinence  MUSCULOSKELETAL: Negative for joint pain or swelling, back pain or muscle pain.  SKIN: Negative for lesions, rash, and itching.  PSYCH: Negative for sleep disturbance, mood disorder and recent psychosocial stressors.  ENDOCRINE: Negative for cold or heat intolerance, polyuria, polydipsia and goiter.    PHYSICAL EXAM:  There were no vitals taken for this visit.    General appearance: obese  Skin: warm, no erythema or rashes  Lungs: clear to percussion and auscultation  Heart: regular rhythm and S1, S2 normal  Abdomen: soft, non-tender, no masses, no organomegaly  Extremities: Normal exam of the extremities. No swelling or pain.    No results found for this or any previous visit (from the past 24 hour(s)).    IMPRESSION:  Leonarda Quevedo is a 58 y.o. y.o. female with a BMI of There is no height or weight on file to calculate BMI..    They have been preoperatively evaluated and deemed to be an appropriate candidate for bariatric surgery.  Surgery Type: Laparoscopic sleeve gastrectomy  27774 and Hiatal hernia repair 05719    All testing reviewed.  All clearances contained.    PLAN:    The risks  of Laparoscopic sleeve gastrectomy  69004 and Hiatal hernia repair 14248 surgery including bleeding, leak, wound infection, dehydration, ulcers, internal hernia, DVT/PE, prolonged nausea/vomiting, incomplete resolution of associated medical conditions, reflux, weight regain, vitamin/mineral deficiencies, and death have been explained to the patient and Leonarda Quevedo has expressed understanding and acceptance of them.    The benefits of the above surgery including weight loss, improvement/resolution of associated medical and mental health conditions, improved mobility, and decreased mortality have been explained the the patient and Leonarda Bradleyers has expressed understanding and acceptance of them.    Operative and blood transfusion consent forms were signed by the patient and witnessed today.    I have also discussed the patient's A1c which shows a A1c of 6.2.  I informed the patient that at this point she is considered a prediabetic and that this would have to be followed closely however after she undergoes weight loss operation and this might improve but encouraged her to monitor her glucose more regularly and to follow-up with her primary care physician after her surgery for follow-up for diabetes evaluation    The patient was consented in the office and all risks and concerns were explained    Prescriptions for all required post-operative home medications were sent to the patient's pharmacy today and the patient will pick them up prior to surgery.    Further education was provided on day of surgery instructions and what to expect from the inpatient admission after surgery.    Jose Boyer   Bariatric and Minimally Invasive General Surgery Fellow

## 2024-02-28 NOTE — PATIENT INSTRUCTIONS
You are scheduled for a: Sleeve Gastrectomy + Possible Hiatal Hernia Repair with Dr. Arzola on 3/13/24    YOU DO NOT NEED TO DO A BOWEL PREP.  You will be on clear liquids only starting the day prior to surgery. Please refer to your final pre op book/RD instructions.     You will receive a phone call the day prior to surgery with your OR arrival time.  Be sure to read over your Pre-Op book for final preparation for surgery.  Make a shopping list and  your supplements prior to surgery.     Prescriptions for Omeprazole (antacid), Oxycodone (pain),  and Ondansetron (anti-Nausea) have been sent to your retail pharmacy. Pick these up if you have not yet done so - these are for after surgery.    Be sure to take the omeprazole every day for six months starting when you get home from the hospital. Open the capsule and sprinkle over SF applesauce, pudding or yogurt. DO NOT MISS A DOSE.     Call with any questions! [phone] for Michaela.

## 2024-02-28 NOTE — PROGRESS NOTES
BARIATRIC SURGERY PREOPERATIVE VISIT    Date: 02/28/24  Time: [unfilled]    Name: Leonarda Quevedo    MRN: [id number]    This is a 58 y.o. y.o. female with morbid obesity her BMI is 41.24 and she weighs 244 pounds with a height of 5 5.  Who plans to undergo Laparoscopic sleeve gastrectomy  12640 and Hiatal hernia repair 66077 surgery. They have completed a rigorous preoperative medical work-up and bariatric surgery educational program.  I discussed surgical options again with the patient and she is ready to proceed with a sleeve gastrectomy.  The patient has met all her milestones and will be undergoing her surgery on 3/13.     PMH:   Patient Active Problem List   Diagnosis    Abnormal mammogram    Abnormal weight gain    Gastroesophageal reflux disease    Hypertension    Psychological factors affecting morbid obesity (CMS/HCC)    VINCE (obstructive sleep apnea)    Morbid obesity with BMI of 40.0-44.9, adult (CMS/HCC)    Left breast mass    Disturbance in sleep behavior    Chronic eczematous otitis externa of both ears    Abnormal EKG    Pre-operative cardiovascular examination    Cough    Gastritis    Vitamin D deficiency    Swelling of hand    Shoulder pain    Polyp of colon    Bariatric surgery status    Diaphragmatic hernia    Disorder of upper gastrointestinal tract    Fatigue    Heartburn    Hemorrhoids    Incomplete right bundle branch block (RBBB)    Itching of ear    Menopausal symptom    Numbness of finger    Metabolic syndrome    Pain of left hand    Sensorineural hearing loss (SNHL) of both ears    Benign neoplasm of sigmoid colon    Otalgia, right ear    Congenital musculoskeletal deformity of spine    Allergic rhinitis    Ankle pain    Back pain    Unspecified lump in the left breast, overlapping quadrants    Unspecified lump in the right breast, overlapping quadrants    History of substance dependence (CMS/HCC)    Breast asymmetry    Disorder of teeth and supporting structures    Other hyperlipidemia       PSH:    Past Surgical History:   Procedure Laterality Date    BACK SURGERY  03/25/2015    Back Surgery    TOTAL KNEE ARTHROPLASTY  03/25/2015    Knee Replacement       Social hx:   Social History     Socioeconomic History    Marital status: Single     Spouse name: Not on file    Number of children: Not on file    Years of education: Not on file    Highest education level: Not on file   Occupational History    Not on file   Tobacco Use    Smoking status: Every Day     Packs/day: .25     Types: Cigarettes    Smokeless tobacco: Never   Substance and Sexual Activity    Alcohol use: Yes     Comment: rarely    Drug use: Not on file    Sexual activity: Not on file   Other Topics Concern    Not on file   Social History Narrative    Not on file     Social Determinants of Health     Financial Resource Strain: Not on file   Food Insecurity: Not on file   Transportation Needs: Not on file   Physical Activity: Not on file   Stress: Not on file   Social Connections: Not on file   Intimate Partner Violence: Not on file   Housing Stability: Not on file       Initial weight: 246  Current weight: There were no vitals filed for this visit.    Preop Clearances:  Cardiac: Cleared  Pulmonary: Cleared  Psych: Cleared    History of Clotting Disorder: Denies personal or family history of blood clots or blood clotting disorder  Anticoagulation plan: subcutaneous heparin while admitted, encouraged hourly ambulation and adequate hydration post op      Sleep Study: Moderate apnea and Compliant with CPAP: 73%    Endoscopy 8/7/2023:   Findings:       The Z-line was irregular and was found 40 cm from the incisors. Biopsies        were taken with a cold forceps for histology. Estimated blood loss was        minimal.       A 3 cm hiatal hernia was present.       The gastroesophageal flap valve was visualized endoscopically and        classified as Hill Grade III (minimal fold, loose to endoscope, hiatal        hernia likely).       Localized mildly  erythematous mucosa without bleeding was found in the        prepyloric region of the stomach. Biopsies were taken with a cold        forceps for Helicobacter pylori testing. Estimated blood loss was        minimal.       One 2 mm sessile polyp with no bleeding was found in the ampulla. The        polyp was removed with a cold biopsy forceps. Resection and retrieval        were complete. Estimated blood loss was minimal.       The duodenal bulb, first portion of the duodenum and second portion of        the duodenum were normal.  Estimated Blood Loss:       Estimated blood loss was minimal.  Impression:            - Z-line irregular, 40 cm from the incisors. Biopsied.                         - 3 cm hiatal hernia.                         - Gastroesophageal flap valve classified as Hill Grade                          III (minimal fold, loose to endoscope, hiatal hernia                          likely).                         - Erythematous mucosa in the prepyloric region of the                          stomach. Biopsied.                         - One duodenal polyp. Resected and retrieved.                         - Normal duodenal bulb, first portion of the duodenum     Preadmission testing date: 2/29/24 @ 0930    MEDICATIONS:  Prior to Admission Medications:    Current Outpatient Medications:     acetaminophen (Tylenol 8 HOUR) 650 mg ER tablet, Take 1 tablet (650 mg) by mouth every 8 hours if needed for mild pain (1 - 3). Do not crush, chew, or split., Disp: , Rfl:     ascorbic acid/collagen hydr (COLLAGEN SKIN RENEWAL ORAL), Take 1 capsule by mouth once daily., Disp: , Rfl:     cyanocobalamin (Vitamin B-12) 100 mcg tablet, Take 1 tablet (100 mcg) by mouth once daily., Disp: , Rfl:     ergocalciferol (Vitamin D-2) 1.25 MG (25080 UT) capsule, TAKE 1 CAPSULE (63454 UNITS) BY MOUTH ONE TIME PER WEEK, Disp: 12 capsule, Rfl: 0    ibuprofen 800 mg tablet, Take 1 tablet (800 mg) by mouth 3 times a day as needed., Disp: ,  Rfl:     lisinopriL-hydrochlorothiazide 10-12.5 mg tablet, Take 1 tablet by mouth once daily., Disp: 90 tablet, Rfl: 1    metoprolol tartrate (Lopressor) 50 mg tablet, Take 1 tablet by mouth 1 time for 1 dose. Take tablet 2 hours before coronary CT scan, Disp: 1 tablet, Rfl: 0    multivitamin capsule, Take 1 capsule by mouth once daily., Disp: , Rfl:     ALLERGIES:  No Known Allergies    REVIEW OF SYSTEMS:  GENERAL: Obese. Negative for malaise, significant weight loss and fever  NECK: Negative for lumps, goiter, pain and significant neck swelling  RESPIRATORY: Negative for cough, wheezing or shortness of breath.  CARDIOVASCULAR: Negative for chest pain, leg swelling or palpitations.  GI: Negative for abdominal discomfort, blood in stools or black stools or change in bowel habits  : No history of dysuria, frequency or incontinence  MUSCULOSKELETAL: Negative for joint pain or swelling, back pain or muscle pain.  SKIN: Negative for lesions, rash, and itching.  PSYCH: Negative for sleep disturbance, mood disorder and recent psychosocial stressors.  ENDOCRINE: Negative for cold or heat intolerance, polyuria, polydipsia and goiter.    PHYSICAL EXAM:  There were no vitals taken for this visit.    General appearance: obese  Skin: warm, no erythema or rashes  Lungs: clear to percussion and auscultation  Heart: regular rhythm and S1, S2 normal  Abdomen: soft, non-tender, no masses, no organomegaly  Extremities: Normal exam of the extremities. No swelling or pain.    No results found for this or any previous visit (from the past 24 hour(s)).    IMPRESSION:  Leonarda Quevedo is a 58 y.o. y.o. female with a BMI of There is no height or weight on file to calculate BMI..    They have been preoperatively evaluated and deemed to be an appropriate candidate for bariatric surgery.  Surgery Type: Laparoscopic sleeve gastrectomy  20342 and Hiatal hernia repair 08969    All testing reviewed.  All clearances contained.    PLAN:    The risks  of Laparoscopic sleeve gastrectomy  07799 and Hiatal hernia repair 79064 surgery including bleeding, leak, wound infection, dehydration, ulcers, internal hernia, DVT/PE, prolonged nausea/vomiting, incomplete resolution of associated medical conditions, reflux, weight regain, vitamin/mineral deficiencies, and death have been explained to the patient and Leonarda Quevedo has expressed understanding and acceptance of them.    The benefits of the above surgery including weight loss, improvement/resolution of associated medical and mental health conditions, improved mobility, and decreased mortality have been explained the the patient and Leonarda Bradleyers has expressed understanding and acceptance of them.    Operative and blood transfusion consent forms were signed by the patient and witnessed today.    I have also discussed the patient's A1c which shows a A1c of 6.2.  I informed the patient that at this point she is considered a prediabetic and that this would have to be followed closely however after she undergoes weight loss operation and this might improve but encouraged her to monitor her glucose more regularly and to follow-up with her primary care physician after her surgery for follow-up for diabetes evaluation    The patient was consented in the office and all risks and concerns were explained    Prescriptions for all required post-operative home medications were sent to the patient's pharmacy today and the patient will pick them up prior to surgery.    Further education was provided on day of surgery instructions and what to expect from the inpatient admission after surgery.    Jose Boyer   Bariatric and Minimally Invasive General Surgery Fellow

## 2024-02-28 NOTE — TELEPHONE ENCOUNTER
Outbound call to patient to confirm if she has ever had a blood clot in the past. She denies any personal or family history of blood clots.  BMI does not meet criteria for anticoagulation post op.

## 2024-02-29 ENCOUNTER — CLINICAL SUPPORT (OUTPATIENT)
Dept: SURGERY | Facility: CLINIC | Age: 59
End: 2024-02-29
Payer: COMMERCIAL

## 2024-02-29 ENCOUNTER — APPOINTMENT (OUTPATIENT)
Dept: SURGERY | Facility: CLINIC | Age: 59
End: 2024-02-29
Payer: COMMERCIAL

## 2024-02-29 ENCOUNTER — OFFICE VISIT (OUTPATIENT)
Dept: SURGERY | Facility: CLINIC | Age: 59
End: 2024-02-29
Payer: COMMERCIAL

## 2024-02-29 ENCOUNTER — PRE-ADMISSION TESTING (OUTPATIENT)
Dept: PREADMISSION TESTING | Facility: HOSPITAL | Age: 59
End: 2024-02-29
Payer: COMMERCIAL

## 2024-02-29 VITALS
DIASTOLIC BLOOD PRESSURE: 94 MMHG | HEIGHT: 65 IN | SYSTOLIC BLOOD PRESSURE: 138 MMHG | BODY MASS INDEX: 40.65 KG/M2 | WEIGHT: 244 LBS | OXYGEN SATURATION: 98 % | HEART RATE: 93 BPM

## 2024-02-29 DIAGNOSIS — E78.49 OTHER HYPERLIPIDEMIA: ICD-10-CM

## 2024-02-29 DIAGNOSIS — K21.9 GASTROESOPHAGEAL REFLUX DISEASE WITHOUT ESOPHAGITIS: ICD-10-CM

## 2024-02-29 DIAGNOSIS — I10 HYPERTENSION, UNSPECIFIED TYPE: ICD-10-CM

## 2024-02-29 DIAGNOSIS — K29.00 ACUTE GASTRITIS WITHOUT HEMORRHAGE, UNSPECIFIED GASTRITIS TYPE: ICD-10-CM

## 2024-02-29 DIAGNOSIS — E66.01 MORBID (SEVERE) OBESITY DUE TO EXCESS CALORIES (MULTI): ICD-10-CM

## 2024-02-29 DIAGNOSIS — Z98.84 BARIATRIC SURGERY STATUS: ICD-10-CM

## 2024-02-29 DIAGNOSIS — E55.9 VITAMIN D DEFICIENCY: ICD-10-CM

## 2024-02-29 DIAGNOSIS — M54.9 BACK PAIN, UNSPECIFIED BACK LOCATION, UNSPECIFIED BACK PAIN LATERALITY, UNSPECIFIED CHRONICITY: ICD-10-CM

## 2024-02-29 DIAGNOSIS — Z98.890 POST-OPERATIVE NAUSEA AND VOMITING: ICD-10-CM

## 2024-02-29 DIAGNOSIS — G47.33 OSA (OBSTRUCTIVE SLEEP APNEA): ICD-10-CM

## 2024-02-29 DIAGNOSIS — E88.810 METABOLIC SYNDROME: ICD-10-CM

## 2024-02-29 DIAGNOSIS — R11.2 POST-OPERATIVE NAUSEA AND VOMITING: ICD-10-CM

## 2024-02-29 DIAGNOSIS — E66.01 MORBID OBESITY WITH BMI OF 40.0-44.9, ADULT (MULTI): ICD-10-CM

## 2024-02-29 DIAGNOSIS — G89.18 POST-OP PAIN: ICD-10-CM

## 2024-02-29 LAB
ABO GROUP (TYPE) IN BLOOD: NORMAL
ALBUMIN SERPL BCP-MCNC: 4.4 G/DL (ref 3.4–5)
ALP SERPL-CCNC: 56 U/L (ref 33–110)
ALT SERPL W P-5'-P-CCNC: 25 U/L (ref 7–45)
ANION GAP SERPL CALC-SCNC: 13 MMOL/L (ref 10–20)
ANTIBODY SCREEN: NORMAL
APPEARANCE UR: ABNORMAL
AST SERPL W P-5'-P-CCNC: 24 U/L (ref 9–39)
BILIRUB SERPL-MCNC: 0.5 MG/DL (ref 0–1.2)
BILIRUB UR STRIP.AUTO-MCNC: NEGATIVE MG/DL
BUN SERPL-MCNC: 19 MG/DL (ref 6–23)
CALCIUM SERPL-MCNC: 9.8 MG/DL (ref 8.6–10.3)
CHLORIDE SERPL-SCNC: 100 MMOL/L (ref 98–107)
CO2 SERPL-SCNC: 28 MMOL/L (ref 21–32)
COLOR UR: YELLOW
COTININE UR QL SCN: NEGATIVE
CREAT SERPL-MCNC: 0.73 MG/DL (ref 0.5–1.05)
EGFRCR SERPLBLD CKD-EPI 2021: >90 ML/MIN/1.73M*2
ERYTHROCYTE [DISTWIDTH] IN BLOOD BY AUTOMATED COUNT: 13.4 % (ref 11.5–14.5)
GLUCOSE SERPL-MCNC: 104 MG/DL (ref 74–99)
GLUCOSE UR STRIP.AUTO-MCNC: NEGATIVE MG/DL
HCT VFR BLD AUTO: 43.9 % (ref 36–46)
HGB BLD-MCNC: 14.2 G/DL (ref 12–16)
INR PPP: 1 (ref 0.9–1.1)
KETONES UR STRIP.AUTO-MCNC: NEGATIVE MG/DL
LEUKOCYTE ESTERASE UR QL STRIP.AUTO: NEGATIVE
MCH RBC QN AUTO: 28.6 PG (ref 26–34)
MCHC RBC AUTO-ENTMCNC: 32.3 G/DL (ref 32–36)
MCV RBC AUTO: 89 FL (ref 80–100)
NITRITE UR QL STRIP.AUTO: NEGATIVE
NRBC BLD-RTO: 0 /100 WBCS (ref 0–0)
PH UR STRIP.AUTO: 5 [PH]
PLATELET # BLD AUTO: 296 X10*3/UL (ref 150–450)
POTASSIUM SERPL-SCNC: 4 MMOL/L (ref 3.5–5.3)
PROT SERPL-MCNC: 6.9 G/DL (ref 6.4–8.2)
PROT UR STRIP.AUTO-MCNC: NEGATIVE MG/DL
PROTHROMBIN TIME: 11.4 SECONDS (ref 9.8–12.8)
RBC # BLD AUTO: 4.96 X10*6/UL (ref 4–5.2)
RBC # UR STRIP.AUTO: NEGATIVE /UL
RH FACTOR (ANTIGEN D): NORMAL
SODIUM SERPL-SCNC: 137 MMOL/L (ref 136–145)
SP GR UR STRIP.AUTO: 1.01
UROBILINOGEN UR STRIP.AUTO-MCNC: <2 MG/DL
WBC # BLD AUTO: 7.7 X10*3/UL (ref 4.4–11.3)

## 2024-02-29 PROCEDURE — 99214 OFFICE O/P EST MOD 30 MIN: CPT | Performed by: SURGERY

## 2024-02-29 PROCEDURE — 3080F DIAST BP >= 90 MM HG: CPT | Performed by: SURGERY

## 2024-02-29 PROCEDURE — 86901 BLOOD TYPING SEROLOGIC RH(D): CPT

## 2024-02-29 PROCEDURE — 3008F BODY MASS INDEX DOCD: CPT | Performed by: SURGERY

## 2024-02-29 PROCEDURE — 80053 COMPREHEN METABOLIC PANEL: CPT

## 2024-02-29 PROCEDURE — 85610 PROTHROMBIN TIME: CPT

## 2024-02-29 PROCEDURE — 36415 COLL VENOUS BLD VENIPUNCTURE: CPT

## 2024-02-29 PROCEDURE — 85027 COMPLETE CBC AUTOMATED: CPT

## 2024-02-29 PROCEDURE — 3075F SYST BP GE 130 - 139MM HG: CPT | Performed by: SURGERY

## 2024-02-29 PROCEDURE — 81003 URINALYSIS AUTO W/O SCOPE: CPT

## 2024-02-29 RX ORDER — ONDANSETRON 4 MG/1
4 TABLET, ORALLY DISINTEGRATING ORAL EVERY 6 HOURS PRN
Qty: 60 TABLET | Refills: 1 | Status: SHIPPED | OUTPATIENT
Start: 2024-02-29

## 2024-02-29 RX ORDER — OXYCODONE HCL 5 MG/5 ML
5 SOLUTION, ORAL ORAL EVERY 6 HOURS PRN
Qty: 140 ML | Refills: 0 | Status: SHIPPED | OUTPATIENT
Start: 2024-02-29 | End: 2024-03-15 | Stop reason: HOSPADM

## 2024-02-29 RX ORDER — CYANOCOBALAMIN, ISOPROPYL ALCOHOL 1000MCG/ML
1000 KIT INJECTION
Qty: 1 KIT | Refills: 5 | Status: SHIPPED | OUTPATIENT
Start: 2024-02-29 | End: 2024-08-27

## 2024-02-29 RX ORDER — OMEPRAZOLE 40 MG/1
40 CAPSULE, DELAYED RELEASE ORAL
Qty: 90 CAPSULE | Refills: 1 | Status: ON HOLD | OUTPATIENT
Start: 2024-02-29 | End: 2024-03-13 | Stop reason: ALTCHOICE

## 2024-02-29 NOTE — PROGRESS NOTES
Leonarda Quevedo attended final pre op class. Patient participated in quiz and asked appropriate questions.    [facility]  Date of Surgery: 3/13/24   Surgeon: Dr. Tyshawn Arzola    Surgical Procedure: Laparoscopic sleeve gastrectomy  26900    Michaela Mao

## 2024-02-29 NOTE — PREPROCEDURE INSTRUCTIONS
Medication List            Accurate as of February 29, 2024 10:06 AM. Always use your most recent med list.                acetaminophen 650 mg ER tablet  Commonly known as: Tylenol 8 HOUR  Medication Adjustments for Surgery: Continue until night before surgery     B-12 Compliance 1,000 mcg/mL kit  Generic drug: cyanocobalamin (vitamin B-12)  Inject 1,000 mcg as directed every 28 (twenty-eight) days.  Medication Adjustments for Surgery: Stop 7 days before surgery     COLLAGEN SKIN RENEWAL ORAL  Medication Adjustments for Surgery: Stop 7 days before surgery     ergocalciferol 1.25 MG (36014 UT) capsule  Commonly known as: Vitamin D-2  TAKE 1 CAPSULE (77271 UNITS) BY MOUTH ONE TIME PER WEEK  Medication Adjustments for Surgery: Stop 7 days before surgery     lisinopriL-hydrochlorothiazide 10-12.5 mg tablet  Take 1 tablet by mouth once daily.  Medication Adjustments for Surgery: Continue until night before surgery     metoprolol tartrate 50 mg tablet  Commonly known as: Lopressor  Take 1 tablet by mouth 1 time for 1 dose. Take tablet 2 hours before coronary CT scan  Notes to patient: DOES NOT TAKE     multivitamin capsule  Medication Adjustments for Surgery: Stop 7 days before surgery     omeprazole 40 mg DR capsule  Commonly known as: PriLOSEC  Take 1 capsule (40 mg) by mouth once daily in the morning. Take before meals. Do not crush or chew. Open capsule, sprinkle beads on SF jello, pudding or applesauce.  Medication Adjustments for Surgery: Continue until night before surgery     ondansetron ODT 4 mg disintegrating tablet  Commonly known as: Zofran-ODT  Take 1 tablet (4 mg) by mouth every 6 hours if needed for nausea or vomiting.     oxyCODONE 5 mg/5 mL solution  Commonly known as: Roxicodone  Take 5 mL (5 mg) by mouth every 6 hours if needed for severe pain (7 - 10) or moderate pain (4 - 6) for up to 7 days.              PRE-OPERATIVE INSTRUCTIONS FOR SURGERY    *Do not eat anything after midnight the night of  surgery.  This includes food of any kind (including hard candy, cough drops, mints and gum) and beverages (including coffee and soda).     PLEASE TAKE NO MEDICATIONS AM OF SURGERY    OF NOTE - PLEASE RESTRICT YOURSELF TO CLEAR FLUIDS 24HRS PRIOR TO SURGERY (ALL DAY TUESDAY)     *One of our staff members will call you ONE business day before your surgery, between 11a-2p To let you know the time to arrive. (EXPECT CALL TUESDAY 3/12/23)    If you have no received a call by 2 pm, call [phone]    *When you arrive at the hospital-->GO TO Registration on the ground floor  *Stop smoking 24 hours prior to surgery.  No Marijuana, CBD Oil or Vaping for 48 hours    *No alcohol 24 hours prior to surgery    *You will need a responsible adult to drive you home    -No acrylic nails or nail polish on at least one fingernail, NO polish on toes for foot surgery    -You may be asked to remove your dentures, partial plate, eyeglasses or contact lenses before going to surgery.  Please bring a case for these items.    -Body piercings need to be removed.  Jewelry and valuables should be left at home.    -Put on loose,  comfortable, clean clothing, that will accommodate bandages      HOME PREOPERATIVE ANTIBACTERIAL SHOWER:  -This shower is a way of cleaning the skin with germ killing solution before surgery.  The solution contains Chorhexidine (CHG).   Let your Dr. Know if you are allergic to Chlorhexidine.    NIGHT BEFORE SURGERY:  I  -Take a normal shower and wash your hair  -Turn OFF water to avoid rinsing the antibacterial skin cleanser off (CHG).  -Apply the CHG cleanser to a clean and wet washcloth.  Lather your entire body from the neck down.    -DO NOT USE ON THE HEAD, FACE, or GENITALS.  -RINSE immediately if CHG is in contact with your eyes, ears or mouth  -Gently wash your body.  Have the CHG cleanser stay on your skin for 3 MINUTES.  -After 3 minutes, turn on water and rinse the CHG cleanser off your body completely  -DO  NOT WASH with regular soap after using CHG.  -PAT DRY with a clean fresh towel  -DO NOT apply any llanos, deodorants or lotions  -Dress in clean night cloths  **CHG cleanser will cause stains to fabrics if you wash them with bleach after use.     DAY OF SURGERY:  -Take shower-->JUST GET WET.  Turn OFF water.  -REPEAT the CHG cleanse as you did the night before.  -PAT DRY-->    What you may be asked to bring to surgery:  ___PHOTO ID AND INSURANCE INFORMATION  ___CPAP machine  ___Urine specimen TO TEST FOR PREGNANCY                 NPO Instructions:    Do not eat any food after midnight the night before your surgery/procedure.    Additional Instructions:     Day of Surgery:  Review your medication instructions, take indicated medications  Wear  comfortable loose fitting clothing  Do not use moisturizers, creams, lotions or perfume  All jewelry and valuables should be left at home

## 2024-03-08 ENCOUNTER — TELEPHONE (OUTPATIENT)
Dept: PRIMARY CARE | Facility: CLINIC | Age: 59
End: 2024-03-08
Payer: COMMERCIAL

## 2024-03-08 NOTE — TELEPHONE ENCOUNTER
Patient called and is having surgery on March 13 with Dr. Henry.   She has a cough and chest congestion she wanted to know if you could prescribe medication for her so so she can have her surgery?    [phone]

## 2024-03-13 ENCOUNTER — ANESTHESIA EVENT (OUTPATIENT)
Dept: OPERATING ROOM | Facility: HOSPITAL | Age: 59
End: 2024-03-13
Payer: COMMERCIAL

## 2024-03-13 ENCOUNTER — HOSPITAL ENCOUNTER (INPATIENT)
Facility: HOSPITAL | Age: 59
LOS: 2 days | Discharge: HOME | End: 2024-03-15
Attending: SURGERY | Admitting: SURGERY
Payer: COMMERCIAL

## 2024-03-13 ENCOUNTER — ANESTHESIA (OUTPATIENT)
Dept: OPERATING ROOM | Facility: HOSPITAL | Age: 59
End: 2024-03-13
Payer: COMMERCIAL

## 2024-03-13 DIAGNOSIS — E66.01 MORBID (SEVERE) OBESITY DUE TO EXCESS CALORIES (MULTI): ICD-10-CM

## 2024-03-13 DIAGNOSIS — Z98.84 BARIATRIC SURGERY STATUS: Primary | ICD-10-CM

## 2024-03-13 LAB
ABO GROUP (TYPE) IN BLOOD: NORMAL
PREGNANCY TEST URINE, POC: NEGATIVE
RH FACTOR (ANTIGEN D): NORMAL

## 2024-03-13 PROCEDURE — A43775 PR LAP, GAST RESTRICT PROC, LONGITUDINAL GASTRECTOMY

## 2024-03-13 PROCEDURE — 43775 LAP SLEEVE GASTRECTOMY: CPT | Performed by: SURGERY

## 2024-03-13 PROCEDURE — 1100000001 HC PRIVATE ROOM DAILY

## 2024-03-13 PROCEDURE — A43775 PR LAP, GAST RESTRICT PROC, LONGITUDINAL GASTRECTOMY: Performed by: ANESTHESIOLOGY

## 2024-03-13 PROCEDURE — 2500000005 HC RX 250 GENERAL PHARMACY W/O HCPCS: Performed by: SURGERY

## 2024-03-13 PROCEDURE — 88307 TISSUE EXAM BY PATHOLOGIST: CPT | Mod: TC,PARLAB | Performed by: STUDENT IN AN ORGANIZED HEALTH CARE EDUCATION/TRAINING PROGRAM

## 2024-03-13 PROCEDURE — 0BQT4ZZ REPAIR DIAPHRAGM, PERCUTANEOUS ENDOSCOPIC APPROACH: ICD-10-PCS | Performed by: SURGERY

## 2024-03-13 PROCEDURE — 36415 COLL VENOUS BLD VENIPUNCTURE: CPT | Performed by: SURGERY

## 2024-03-13 PROCEDURE — 43281 LAP PARAESOPHAG HERN REPAIR: CPT | Performed by: SURGERY

## 2024-03-13 PROCEDURE — 3700000001 HC GENERAL ANESTHESIA TIME - INITIAL BASE CHARGE: Performed by: SURGERY

## 2024-03-13 PROCEDURE — 2500000001 HC RX 250 WO HCPCS SELF ADMINISTERED DRUGS (ALT 637 FOR MEDICARE OP): Performed by: STUDENT IN AN ORGANIZED HEALTH CARE EDUCATION/TRAINING PROGRAM

## 2024-03-13 PROCEDURE — 43281 LAP PARAESOPHAG HERN REPAIR: CPT | Performed by: STUDENT IN AN ORGANIZED HEALTH CARE EDUCATION/TRAINING PROGRAM

## 2024-03-13 PROCEDURE — 3600000009 HC OR TIME - EACH INCREMENTAL 1 MINUTE - PROCEDURE LEVEL FOUR: Performed by: SURGERY

## 2024-03-13 PROCEDURE — 2720000007 HC OR 272 NO HCPCS: Performed by: SURGERY

## 2024-03-13 PROCEDURE — 2500000004 HC RX 250 GENERAL PHARMACY W/ HCPCS (ALT 636 FOR OP/ED): Performed by: STUDENT IN AN ORGANIZED HEALTH CARE EDUCATION/TRAINING PROGRAM

## 2024-03-13 PROCEDURE — 7100000001 HC RECOVERY ROOM TIME - INITIAL BASE CHARGE: Performed by: SURGERY

## 2024-03-13 PROCEDURE — 3700000002 HC GENERAL ANESTHESIA TIME - EACH INCREMENTAL 1 MINUTE: Performed by: SURGERY

## 2024-03-13 PROCEDURE — 2500000004 HC RX 250 GENERAL PHARMACY W/ HCPCS (ALT 636 FOR OP/ED): Performed by: SURGERY

## 2024-03-13 PROCEDURE — 0DB64Z3 EXCISION OF STOMACH, PERCUTANEOUS ENDOSCOPIC APPROACH, VERTICAL: ICD-10-PCS | Performed by: SURGERY

## 2024-03-13 PROCEDURE — 43239 EGD BIOPSY SINGLE/MULTIPLE: CPT | Performed by: SURGERY

## 2024-03-13 PROCEDURE — 2500000005 HC RX 250 GENERAL PHARMACY W/O HCPCS

## 2024-03-13 PROCEDURE — 2500000004 HC RX 250 GENERAL PHARMACY W/ HCPCS (ALT 636 FOR OP/ED): Performed by: ANESTHESIOLOGY

## 2024-03-13 PROCEDURE — 43775 LAP SLEEVE GASTRECTOMY: CPT | Performed by: STUDENT IN AN ORGANIZED HEALTH CARE EDUCATION/TRAINING PROGRAM

## 2024-03-13 PROCEDURE — 88307 TISSUE EXAM BY PATHOLOGIST: CPT | Performed by: PATHOLOGY

## 2024-03-13 PROCEDURE — 2500000004 HC RX 250 GENERAL PHARMACY W/ HCPCS (ALT 636 FOR OP/ED)

## 2024-03-13 PROCEDURE — 7100000002 HC RECOVERY ROOM TIME - EACH INCREMENTAL 1 MINUTE: Performed by: SURGERY

## 2024-03-13 PROCEDURE — 81025 URINE PREGNANCY TEST: CPT | Performed by: ANESTHESIOLOGY

## 2024-03-13 PROCEDURE — A4217 STERILE WATER/SALINE, 500 ML: HCPCS | Performed by: SURGERY

## 2024-03-13 PROCEDURE — 0DJ68ZZ INSPECTION OF STOMACH, VIA NATURAL OR ARTIFICIAL OPENING ENDOSCOPIC: ICD-10-PCS | Performed by: SURGERY

## 2024-03-13 PROCEDURE — 3600000004 HC OR TIME - INITIAL BASE CHARGE - PROCEDURE LEVEL FOUR: Performed by: SURGERY

## 2024-03-13 RX ORDER — MIDAZOLAM HYDROCHLORIDE 1 MG/ML
INJECTION, SOLUTION INTRAMUSCULAR; INTRAVENOUS AS NEEDED
Status: DISCONTINUED | OUTPATIENT
Start: 2024-03-13 | End: 2024-03-13

## 2024-03-13 RX ORDER — ONDANSETRON HYDROCHLORIDE 2 MG/ML
INJECTION, SOLUTION INTRAVENOUS AS NEEDED
Status: DISCONTINUED | OUTPATIENT
Start: 2024-03-13 | End: 2024-03-13

## 2024-03-13 RX ORDER — MORPHINE SULFATE 2 MG/ML
2 INJECTION, SOLUTION INTRAMUSCULAR; INTRAVENOUS EVERY 5 MIN PRN
Status: DISCONTINUED | OUTPATIENT
Start: 2024-03-13 | End: 2024-03-13 | Stop reason: HOSPADM

## 2024-03-13 RX ORDER — SCOLOPAMINE TRANSDERMAL SYSTEM 1 MG/1
1 PATCH, EXTENDED RELEASE TRANSDERMAL ONCE
Status: DISCONTINUED | OUTPATIENT
Start: 2024-03-13 | End: 2024-03-15 | Stop reason: HOSPADM

## 2024-03-13 RX ORDER — HEPARIN SODIUM 5000 [USP'U]/ML
5000 INJECTION, SOLUTION INTRAVENOUS; SUBCUTANEOUS ONCE
Status: COMPLETED | OUTPATIENT
Start: 2024-03-13 | End: 2024-03-13

## 2024-03-13 RX ORDER — HYDROMORPHONE HYDROCHLORIDE 1 MG/ML
1 INJECTION, SOLUTION INTRAMUSCULAR; INTRAVENOUS; SUBCUTANEOUS EVERY 5 MIN PRN
Status: DISCONTINUED | OUTPATIENT
Start: 2024-03-13 | End: 2024-03-13 | Stop reason: HOSPADM

## 2024-03-13 RX ORDER — ACETAMINOPHEN 160 MG/5ML
650 SOLUTION ORAL EVERY 4 HOURS PRN
Status: DISCONTINUED | OUTPATIENT
Start: 2024-03-13 | End: 2024-03-15 | Stop reason: HOSPADM

## 2024-03-13 RX ORDER — KETOROLAC TROMETHAMINE 30 MG/ML
30 INJECTION, SOLUTION INTRAMUSCULAR; INTRAVENOUS EVERY 6 HOURS SCHEDULED
Status: COMPLETED | OUTPATIENT
Start: 2024-03-13 | End: 2024-03-14

## 2024-03-13 RX ORDER — BUPIVACAINE HYDROCHLORIDE 2.5 MG/ML
INJECTION, SOLUTION INFILTRATION; PERINEURAL AS NEEDED
Status: DISCONTINUED | OUTPATIENT
Start: 2024-03-13 | End: 2024-03-13 | Stop reason: HOSPADM

## 2024-03-13 RX ORDER — SCOLOPAMINE TRANSDERMAL SYSTEM 1 MG/1
1 PATCH, EXTENDED RELEASE TRANSDERMAL ONCE
Status: DISCONTINUED | OUTPATIENT
Start: 2024-03-13 | End: 2024-03-13 | Stop reason: HOSPADM

## 2024-03-13 RX ORDER — DIPHENHYDRAMINE HYDROCHLORIDE 50 MG/ML
25 INJECTION INTRAMUSCULAR; INTRAVENOUS ONCE AS NEEDED
Status: DISCONTINUED | OUTPATIENT
Start: 2024-03-13 | End: 2024-03-13 | Stop reason: HOSPADM

## 2024-03-13 RX ORDER — ONDANSETRON HYDROCHLORIDE 2 MG/ML
4 INJECTION, SOLUTION INTRAVENOUS EVERY 8 HOURS PRN
Status: DISCONTINUED | OUTPATIENT
Start: 2024-03-13 | End: 2024-03-15 | Stop reason: HOSPADM

## 2024-03-13 RX ORDER — METOPROLOL TARTRATE 1 MG/ML
5 INJECTION, SOLUTION INTRAVENOUS ONCE
Status: DISCONTINUED | OUTPATIENT
Start: 2024-03-13 | End: 2024-03-13 | Stop reason: HOSPADM

## 2024-03-13 RX ORDER — ACETAMINOPHEN 325 MG/1
975 TABLET ORAL ONCE
Status: DISCONTINUED | OUTPATIENT
Start: 2024-03-13 | End: 2024-03-13 | Stop reason: HOSPADM

## 2024-03-13 RX ORDER — SIMETHICONE 80 MG
80 TABLET,CHEWABLE ORAL EVERY 4 HOURS PRN
Status: DISCONTINUED | OUTPATIENT
Start: 2024-03-13 | End: 2024-03-15 | Stop reason: HOSPADM

## 2024-03-13 RX ORDER — SODIUM CHLORIDE 0.9 G/100ML
IRRIGANT IRRIGATION AS NEEDED
Status: DISCONTINUED | OUTPATIENT
Start: 2024-03-13 | End: 2024-03-13 | Stop reason: HOSPADM

## 2024-03-13 RX ORDER — LIDOCAINE HCL/PF 100 MG/5ML
SYRINGE (ML) INTRAVENOUS AS NEEDED
Status: DISCONTINUED | OUTPATIENT
Start: 2024-03-13 | End: 2024-03-13

## 2024-03-13 RX ORDER — OXYCODONE HCL 5 MG/5 ML
5 SOLUTION, ORAL ORAL EVERY 6 HOURS PRN
Status: DISCONTINUED | OUTPATIENT
Start: 2024-03-13 | End: 2024-03-14

## 2024-03-13 RX ORDER — SUCCINYLCHOLINE/SOD CL,ISO/PF 100 MG/5ML
SYRINGE (ML) INTRAVENOUS AS NEEDED
Status: DISCONTINUED | OUTPATIENT
Start: 2024-03-13 | End: 2024-03-13

## 2024-03-13 RX ORDER — PROMETHAZINE HYDROCHLORIDE 25 MG/ML
INJECTION, SOLUTION INTRAMUSCULAR; INTRAVENOUS AS NEEDED
Status: DISCONTINUED | OUTPATIENT
Start: 2024-03-13 | End: 2024-03-13

## 2024-03-13 RX ORDER — ESOMEPRAZOLE MAGNESIUM 40 MG/1
40 GRANULE, DELAYED RELEASE ORAL
Status: DISCONTINUED | OUTPATIENT
Start: 2024-03-14 | End: 2024-03-15 | Stop reason: HOSPADM

## 2024-03-13 RX ORDER — PANTOPRAZOLE SODIUM 40 MG/10ML
40 INJECTION, POWDER, LYOPHILIZED, FOR SOLUTION INTRAVENOUS
Status: DISCONTINUED | OUTPATIENT
Start: 2024-03-14 | End: 2024-03-15 | Stop reason: HOSPADM

## 2024-03-13 RX ORDER — FAMOTIDINE 10 MG/ML
20 INJECTION INTRAVENOUS ONCE
Status: DISCONTINUED | OUTPATIENT
Start: 2024-03-13 | End: 2024-03-13 | Stop reason: HOSPADM

## 2024-03-13 RX ORDER — PROPOFOL 10 MG/ML
INJECTION, EMULSION INTRAVENOUS AS NEEDED
Status: DISCONTINUED | OUTPATIENT
Start: 2024-03-13 | End: 2024-03-13

## 2024-03-13 RX ORDER — LABETALOL HYDROCHLORIDE 5 MG/ML
5 INJECTION, SOLUTION INTRAVENOUS ONCE AS NEEDED
Status: DISCONTINUED | OUTPATIENT
Start: 2024-03-13 | End: 2024-03-13 | Stop reason: HOSPADM

## 2024-03-13 RX ORDER — ONDANSETRON HYDROCHLORIDE 2 MG/ML
4 INJECTION, SOLUTION INTRAVENOUS ONCE AS NEEDED
Status: DISCONTINUED | OUTPATIENT
Start: 2024-03-13 | End: 2024-03-13 | Stop reason: HOSPADM

## 2024-03-13 RX ORDER — PANTOPRAZOLE SODIUM 40 MG/1
40 TABLET, DELAYED RELEASE ORAL
Status: DISCONTINUED | OUTPATIENT
Start: 2024-03-14 | End: 2024-03-15 | Stop reason: HOSPADM

## 2024-03-13 RX ORDER — NALOXONE HYDROCHLORIDE 1 MG/ML
0.2 INJECTION INTRAMUSCULAR; INTRAVENOUS; SUBCUTANEOUS EVERY 5 MIN PRN
Status: DISCONTINUED | OUTPATIENT
Start: 2024-03-13 | End: 2024-03-15 | Stop reason: HOSPADM

## 2024-03-13 RX ORDER — FENTANYL CITRATE 50 UG/ML
INJECTION, SOLUTION INTRAMUSCULAR; INTRAVENOUS AS NEEDED
Status: DISCONTINUED | OUTPATIENT
Start: 2024-03-13 | End: 2024-03-13

## 2024-03-13 RX ORDER — SODIUM CHLORIDE, SODIUM LACTATE, POTASSIUM CHLORIDE, CALCIUM CHLORIDE 600; 310; 30; 20 MG/100ML; MG/100ML; MG/100ML; MG/100ML
125 INJECTION, SOLUTION INTRAVENOUS CONTINUOUS
Status: DISCONTINUED | OUTPATIENT
Start: 2024-03-13 | End: 2024-03-15 | Stop reason: HOSPADM

## 2024-03-13 RX ORDER — SODIUM CHLORIDE, SODIUM LACTATE, POTASSIUM CHLORIDE, CALCIUM CHLORIDE 600; 310; 30; 20 MG/100ML; MG/100ML; MG/100ML; MG/100ML
100 INJECTION, SOLUTION INTRAVENOUS CONTINUOUS
Status: DISCONTINUED | OUTPATIENT
Start: 2024-03-13 | End: 2024-03-13 | Stop reason: HOSPADM

## 2024-03-13 RX ORDER — HEPARIN SODIUM 5000 [USP'U]/ML
5000 INJECTION, SOLUTION INTRAVENOUS; SUBCUTANEOUS EVERY 8 HOURS SCHEDULED
Status: DISCONTINUED | OUTPATIENT
Start: 2024-03-13 | End: 2024-03-15 | Stop reason: HOSPADM

## 2024-03-13 RX ORDER — ALBUTEROL SULFATE 0.83 MG/ML
2.5 SOLUTION RESPIRATORY (INHALATION) ONCE AS NEEDED
Status: DISCONTINUED | OUTPATIENT
Start: 2024-03-13 | End: 2024-03-13 | Stop reason: HOSPADM

## 2024-03-13 RX ORDER — MEPERIDINE HYDROCHLORIDE 25 MG/ML
12.5 INJECTION INTRAMUSCULAR; INTRAVENOUS; SUBCUTANEOUS EVERY 10 MIN PRN
Status: DISCONTINUED | OUTPATIENT
Start: 2024-03-13 | End: 2024-03-13 | Stop reason: HOSPADM

## 2024-03-13 RX ORDER — WATER 1 ML/ML
IRRIGANT IRRIGATION AS NEEDED
Status: DISCONTINUED | OUTPATIENT
Start: 2024-03-13 | End: 2024-03-13 | Stop reason: HOSPADM

## 2024-03-13 RX ORDER — ROCURONIUM BROMIDE 10 MG/ML
INJECTION, SOLUTION INTRAVENOUS AS NEEDED
Status: DISCONTINUED | OUTPATIENT
Start: 2024-03-13 | End: 2024-03-13

## 2024-03-13 RX ORDER — SODIUM CHLORIDE, SODIUM LACTATE, POTASSIUM CHLORIDE, CALCIUM CHLORIDE 600; 310; 30; 20 MG/100ML; MG/100ML; MG/100ML; MG/100ML
100 INJECTION, SOLUTION INTRAVENOUS CONTINUOUS
Status: DISCONTINUED | OUTPATIENT
Start: 2024-03-13 | End: 2024-03-14

## 2024-03-13 RX ORDER — MIDAZOLAM HYDROCHLORIDE 1 MG/ML
1 INJECTION, SOLUTION INTRAMUSCULAR; INTRAVENOUS ONCE AS NEEDED
Status: DISCONTINUED | OUTPATIENT
Start: 2024-03-13 | End: 2024-03-13 | Stop reason: HOSPADM

## 2024-03-13 RX ORDER — ONDANSETRON 4 MG/1
4 TABLET, ORALLY DISINTEGRATING ORAL EVERY 8 HOURS PRN
Status: DISCONTINUED | OUTPATIENT
Start: 2024-03-13 | End: 2024-03-15 | Stop reason: HOSPADM

## 2024-03-13 RX ORDER — HYDRALAZINE HYDROCHLORIDE 20 MG/ML
5 INJECTION INTRAMUSCULAR; INTRAVENOUS EVERY 30 MIN PRN
Status: DISCONTINUED | OUTPATIENT
Start: 2024-03-13 | End: 2024-03-13 | Stop reason: HOSPADM

## 2024-03-13 RX ADMIN — FENTANYL CITRATE 50 MCG: 50 INJECTION, SOLUTION INTRAMUSCULAR; INTRAVENOUS at 09:09

## 2024-03-13 RX ADMIN — CEFAZOLIN 2 G: 2 INJECTION, POWDER, FOR SOLUTION INTRAMUSCULAR; INTRAVENOUS at 20:21

## 2024-03-13 RX ADMIN — HYDROMORPHONE HYDROCHLORIDE 0.5 MG: 2 INJECTION, SOLUTION INTRAMUSCULAR; INTRAVENOUS; SUBCUTANEOUS at 10:36

## 2024-03-13 RX ADMIN — HYDROMORPHONE HYDROCHLORIDE 1 MG: 1 INJECTION, SOLUTION INTRAMUSCULAR; INTRAVENOUS; SUBCUTANEOUS at 10:51

## 2024-03-13 RX ADMIN — ROCURONIUM BROMIDE 40 MG: 10 INJECTION, SOLUTION INTRAVENOUS at 09:16

## 2024-03-13 RX ADMIN — Medication 2 G: at 09:15

## 2024-03-13 RX ADMIN — ROCURONIUM BROMIDE 10 MG: 10 INJECTION, SOLUTION INTRAVENOUS at 09:09

## 2024-03-13 RX ADMIN — ONDANSETRON 4 MG: 2 INJECTION INTRAMUSCULAR; INTRAVENOUS at 10:06

## 2024-03-13 RX ADMIN — LIDOCAINE HYDROCHLORIDE 60 MG: 20 INJECTION, SOLUTION INTRAVENOUS at 09:09

## 2024-03-13 RX ADMIN — SIMETHICONE 80 MG: 80 TABLET, CHEWABLE ORAL at 20:34

## 2024-03-13 RX ADMIN — HEPARIN SODIUM 5000 UNITS: 5000 INJECTION INTRAVENOUS; SUBCUTANEOUS at 07:58

## 2024-03-13 RX ADMIN — SODIUM CHLORIDE, POTASSIUM CHLORIDE, SODIUM LACTATE AND CALCIUM CHLORIDE: 600; 310; 30; 20 INJECTION, SOLUTION INTRAVENOUS at 09:50

## 2024-03-13 RX ADMIN — DEXAMETHASONE SODIUM PHOSPHATE 4 MG: 4 INJECTION, SOLUTION INTRAMUSCULAR; INTRAVENOUS at 09:20

## 2024-03-13 RX ADMIN — KETOROLAC TROMETHAMINE 30 MG: 30 INJECTION, SOLUTION INTRAMUSCULAR; INTRAVENOUS at 13:56

## 2024-03-13 RX ADMIN — HYDROMORPHONE HYDROCHLORIDE 0.5 MG: 2 INJECTION, SOLUTION INTRAMUSCULAR; INTRAVENOUS; SUBCUTANEOUS at 10:33

## 2024-03-13 RX ADMIN — PROPOFOL 150 MG: 10 INJECTION, EMULSION INTRAVENOUS at 09:09

## 2024-03-13 RX ADMIN — SCOPOLAMINE 1 PATCH: 1.5 PATCH, EXTENDED RELEASE TRANSDERMAL at 07:58

## 2024-03-13 RX ADMIN — HEPARIN SODIUM 5000 UNITS: 5000 INJECTION INTRAVENOUS; SUBCUTANEOUS at 14:03

## 2024-03-13 RX ADMIN — KETOROLAC TROMETHAMINE 30 MG: 30 INJECTION, SOLUTION INTRAMUSCULAR; INTRAVENOUS at 20:23

## 2024-03-13 RX ADMIN — PROMETHAZINE HYDROCHLORIDE 6.25 MG: 25 INJECTION INTRAMUSCULAR; INTRAVENOUS at 10:03

## 2024-03-13 RX ADMIN — ONDANSETRON 4 MG: 2 INJECTION INTRAMUSCULAR; INTRAVENOUS at 20:36

## 2024-03-13 RX ADMIN — MIDAZOLAM 2 MG: 1 INJECTION INTRAMUSCULAR; INTRAVENOUS at 08:56

## 2024-03-13 RX ADMIN — SODIUM CHLORIDE, POTASSIUM CHLORIDE, SODIUM LACTATE AND CALCIUM CHLORIDE 100 ML/HR: 600; 310; 30; 20 INJECTION, SOLUTION INTRAVENOUS at 07:59

## 2024-03-13 RX ADMIN — FENTANYL CITRATE 50 MCG: 50 INJECTION, SOLUTION INTRAMUSCULAR; INTRAVENOUS at 08:56

## 2024-03-13 RX ADMIN — ROCURONIUM BROMIDE 10 MG: 10 INJECTION, SOLUTION INTRAVENOUS at 09:54

## 2024-03-13 RX ADMIN — SODIUM CHLORIDE, POTASSIUM CHLORIDE, SODIUM LACTATE AND CALCIUM CHLORIDE: 600; 310; 30; 20 INJECTION, SOLUTION INTRAVENOUS at 08:54

## 2024-03-13 RX ADMIN — SODIUM CHLORIDE, POTASSIUM CHLORIDE, SODIUM LACTATE AND CALCIUM CHLORIDE 150 ML/HR: 600; 310; 30; 20 INJECTION, SOLUTION INTRAVENOUS at 15:53

## 2024-03-13 RX ADMIN — Medication 140 MG: at 09:10

## 2024-03-13 RX ADMIN — HEPARIN SODIUM 5000 UNITS: 5000 INJECTION INTRAVENOUS; SUBCUTANEOUS at 22:35

## 2024-03-13 RX ADMIN — SUGAMMADEX 200 MG: 100 INJECTION, SOLUTION INTRAVENOUS at 10:24

## 2024-03-13 RX ADMIN — SODIUM CHLORIDE, POTASSIUM CHLORIDE, SODIUM LACTATE AND CALCIUM CHLORIDE 100 ML/HR: 600; 310; 30; 20 INJECTION, SOLUTION INTRAVENOUS at 10:32

## 2024-03-13 RX ADMIN — HYDROMORPHONE HYDROCHLORIDE 0.5 MG: 2 INJECTION, SOLUTION INTRAMUSCULAR; INTRAVENOUS; SUBCUTANEOUS at 09:24

## 2024-03-13 SDOH — SOCIAL STABILITY: SOCIAL INSECURITY: HAS ANYONE EVER THREATENED TO HURT YOUR FAMILY OR YOUR PETS?: NO

## 2024-03-13 SDOH — SOCIAL STABILITY: SOCIAL INSECURITY: ABUSE: ADULT

## 2024-03-13 SDOH — SOCIAL STABILITY: SOCIAL INSECURITY: DOES ANYONE TRY TO KEEP YOU FROM HAVING/CONTACTING OTHER FRIENDS OR DOING THINGS OUTSIDE YOUR HOME?: NO

## 2024-03-13 SDOH — SOCIAL STABILITY: SOCIAL INSECURITY: HAVE YOU HAD THOUGHTS OF HARMING ANYONE ELSE?: NO

## 2024-03-13 SDOH — SOCIAL STABILITY: SOCIAL INSECURITY: WERE YOU ABLE TO COMPLETE ALL THE BEHAVIORAL HEALTH SCREENINGS?: YES

## 2024-03-13 SDOH — SOCIAL STABILITY: SOCIAL INSECURITY: DO YOU FEEL UNSAFE GOING BACK TO THE PLACE WHERE YOU ARE LIVING?: NO

## 2024-03-13 SDOH — SOCIAL STABILITY: SOCIAL INSECURITY: ARE THERE ANY APPARENT SIGNS OF INJURIES/BEHAVIORS THAT COULD BE RELATED TO ABUSE/NEGLECT?: NO

## 2024-03-13 SDOH — SOCIAL STABILITY: SOCIAL INSECURITY: DO YOU FEEL ANYONE HAS EXPLOITED OR TAKEN ADVANTAGE OF YOU FINANCIALLY OR OF YOUR PERSONAL PROPERTY?: NO

## 2024-03-13 SDOH — SOCIAL STABILITY: SOCIAL INSECURITY: ARE YOU OR HAVE YOU BEEN THREATENED OR ABUSED PHYSICALLY, EMOTIONALLY, OR SEXUALLY BY ANYONE?: NO

## 2024-03-13 ASSESSMENT — COGNITIVE AND FUNCTIONAL STATUS - GENERAL
TOILETING: A LITTLE
STANDING UP FROM CHAIR USING ARMS: A LITTLE
MOBILITY SCORE: 19
PATIENT BASELINE BEDBOUND: NO
DRESSING REGULAR LOWER BODY CLOTHING: A LITTLE
HELP NEEDED FOR BATHING: A LITTLE
CLIMB 3 TO 5 STEPS WITH RAILING: A LITTLE
WALKING IN HOSPITAL ROOM: A LITTLE
DAILY ACTIVITIY SCORE: 21
MOVING TO AND FROM BED TO CHAIR: A LITTLE
TURNING FROM BACK TO SIDE WHILE IN FLAT BAD: A LITTLE

## 2024-03-13 ASSESSMENT — ACTIVITIES OF DAILY LIVING (ADL)
FEEDING YOURSELF: INDEPENDENT
HEARING - LEFT EAR: FUNCTIONAL
ADEQUATE_TO_COMPLETE_ADL: YES
LACK_OF_TRANSPORTATION: NO
HEARING - RIGHT EAR: FUNCTIONAL
TOILETING: NEEDS ASSISTANCE
JUDGMENT_ADEQUATE_SAFELY_COMPLETE_DAILY_ACTIVITIES: YES
WALKS IN HOME: NEEDS ASSISTANCE
GROOMING: INDEPENDENT
DRESSING YOURSELF: INDEPENDENT
BATHING: NEEDS ASSISTANCE
PATIENT'S MEMORY ADEQUATE TO SAFELY COMPLETE DAILY ACTIVITIES?: YES

## 2024-03-13 ASSESSMENT — LIFESTYLE VARIABLES
SUBSTANCE_ABUSE_PAST_12_MONTHS: NO
SKIP TO QUESTIONS 9-10: 1
HOW MANY STANDARD DRINKS CONTAINING ALCOHOL DO YOU HAVE ON A TYPICAL DAY: 1 OR 2
AUDIT-C TOTAL SCORE: 2
PRESCIPTION_ABUSE_PAST_12_MONTHS: NO
HOW OFTEN DO YOU HAVE A DRINK CONTAINING ALCOHOL: 2-4 TIMES A MONTH
HOW OFTEN DO YOU HAVE 6 OR MORE DRINKS ON ONE OCCASION: NEVER
AUDIT-C TOTAL SCORE: 2

## 2024-03-13 ASSESSMENT — COLUMBIA-SUICIDE SEVERITY RATING SCALE - C-SSRS
2. HAVE YOU ACTUALLY HAD ANY THOUGHTS OF KILLING YOURSELF?: NO
1. IN THE PAST MONTH, HAVE YOU WISHED YOU WERE DEAD OR WISHED YOU COULD GO TO SLEEP AND NOT WAKE UP?: NO
1. IN THE PAST MONTH, HAVE YOU WISHED YOU WERE DEAD OR WISHED YOU COULD GO TO SLEEP AND NOT WAKE UP?: NO
2. HAVE YOU ACTUALLY HAD ANY THOUGHTS OF KILLING YOURSELF?: NO
6. HAVE YOU EVER DONE ANYTHING, STARTED TO DO ANYTHING, OR PREPARED TO DO ANYTHING TO END YOUR LIFE?: NO
6. HAVE YOU EVER DONE ANYTHING, STARTED TO DO ANYTHING, OR PREPARED TO DO ANYTHING TO END YOUR LIFE?: NO

## 2024-03-13 ASSESSMENT — PATIENT HEALTH QUESTIONNAIRE - PHQ9
SUM OF ALL RESPONSES TO PHQ9 QUESTIONS 1 & 2: 0
1. LITTLE INTEREST OR PLEASURE IN DOING THINGS: NOT AT ALL
2. FEELING DOWN, DEPRESSED OR HOPELESS: NOT AT ALL

## 2024-03-13 ASSESSMENT — PAIN - FUNCTIONAL ASSESSMENT
PAIN_FUNCTIONAL_ASSESSMENT: 0-10

## 2024-03-13 ASSESSMENT — PAIN SCALES - GENERAL
PAINLEVEL_OUTOF10: 0 - NO PAIN
PAINLEVEL_OUTOF10: 10 - WORST POSSIBLE PAIN
PAINLEVEL_OUTOF10: 0 - NO PAIN
PAIN_LEVEL: 0

## 2024-03-13 ASSESSMENT — PAIN DESCRIPTION - LOCATION: LOCATION: ABDOMEN

## 2024-03-13 NOTE — ANESTHESIA PREPROCEDURE EVALUATION
Patient: Leonarda Quevedo    Procedure Information       Date/Time: 03/13/24 0900    Procedures:       ROBOTIC ASSISTED LAPAROSCOPIC GASTRIC SLEEVE - Lap Gastric Sleeve      ROBOTIC ASSISTED LAPAROSCOPIC HIATAL HERNIA REPAIR - Repair Diaphragmatic Hernia Laparoscopy    Location: [facility] / [facility]    Surgeons: Tyshawn Arzola MD          Heart Echo:  CONCLUSIONS:  1. Left ventricular systolic function is hyperdynamic with a 70-75% estimated ejection fraction.  2. RVSP within normal limits.     Relevant Problems   Anesthesia (within normal limits)   (-) PONV (postoperative nausea and vomiting)      Cardiovascular   (+) Abnormal EKG   (+) Hypertension   (+) Incomplete right bundle branch block (RBBB)   (+) Other hyperlipidemia      Endocrine   (+) Morbid (severe) obesity due to excess calories (CMS/HCC)      GI   (+) Gastroesophageal reflux disease      Pulmonary   (+) VINCE (obstructive sleep apnea)      Eyes, Ears, Nose, and Throat   (+) Sensorineural hearing loss (SNHL) of both ears       Clinical information reviewed:   Tobacco  Allergies  Meds   Med Hx  Surg Hx  OB Status  Fam Hx  Soc   Hx        NPO Detail:  NPO/Void Status  Date of Last Liquid: 03/12/24  Time of Last Liquid: 2350  Date of Last Solid: 03/11/24  Time of Last Solid: 1900         Physical Exam    Airway  Mallampati: II  TM distance: >3 FB  Neck ROM: full     Cardiovascular - normal exam  Rhythm: regular  Rate: normal     Dental    Pulmonary - normal exam     Abdominal            Anesthesia Plan    History of general anesthesia?: yes  History of complications of general anesthesia?: no    ASA 3     general     intravenous induction   Postoperative administration of opioids is intended.  Trial extubation is planned.  Anesthetic plan and risks discussed with patient.    Plan discussed with CRNA, CAA and attending.

## 2024-03-13 NOTE — OP NOTE
LAPAROSCOPIC GASTRIC SLEEVE / EGD / TAP BLOCK, LAPAROSCOPIC HIATAL HERNIA REPAIR Operative Note     Date: 3/13/2024  OR Location: [facility]    Name: Leonarda Quevedo, : 1965, Age: 58 y.o., MRN: [id number], Sex: female    Diagnosis  Pre-op Diagnosis     * Morbid (severe) obesity due to excess calories (CMS/HCC) [E66.01] Post-op Diagnosis     * Morbid (severe) obesity due to excess calories (CMS/HCC) [E66.01]     Procedures  LAPAROSCOPIC GASTRIC SLEEVE / EGD / TAP BLOCK  87860 - SC LAPS GSTRC RSTRICTIV PX LONGITUDINAL GASTRECTOMY    LAPAROSCOPIC HIATAL HERNIA REPAIR  03966 - SC LAPS RPR PARAESPHGL HRNA INCL FUNDPLSTY W/O MESH    SC EGD TRANSORAL BIOPSY SINGLE/MULTIPLE [22750]  SC RPR PARAESOPH HIATAL HERNIA W/LAPT W/O MESH [81354]  Surgeons      * Tyshawn Arzola - Primary    Resident/Fellow/Other Assistant:  Surgeon(s) and Role: Anupama Ruggiero ( Assistant Surgeon)     Procedure Summary  Anesthesia: General  ASA: III  Anesthesia Staff: Anesthesiologist: Lui Rodriguez MD  CRNA: SAMANTHA Escamilla-CRNA  Estimated Blood Loss: 10 mL  Intra-op Medications: Administrations occurring from 24 1159 to 24 1159:  * No intraprocedure medications in log *           Anesthesia Record               Intraprocedure I/O Totals          Intake    LR bolus 1300.00 mL    ceFAZolin (Ancef) in dextrose 5% (ADV/MBP) IV 2 g 100.00 mL    Total Intake 1400 mL          Specimen:   ID Type Source Tests Collected by Time   1 : PORTION OF STOMACH Tissue STOMACH SLEEVE RESECTION SURGICAL PATHOLOGY EXAM Tyshawn Arzola MD 3/13/2024 1000        Staff:   Circulator: Sarita Mai RN  Scrub Person: Wendi Funk         Drains and/or Catheters: * None in log *    Tourniquet Times:         Implants:     Findings: 2 cm anterior hiatal hernia, negative leak test, hemostatic staple line    Indications: Leonarda Quevedo is an 58 y.o. female who is having surgery for Morbid (severe) obesity due to excess calories (CMS/HCC) [E66.01].  Hiatal  hernia    The patient was seen in the preoperative area. The risks, benefits, complications, treatment options, non-operative alternatives, expected recovery and outcomes were discussed with the patient. The possibilities of reaction to medication, pulmonary aspiration, injury to surrounding structures, bleeding, recurrent infection, the need for additional procedures, failure to diagnose a condition, and creating a complication requiring transfusion or operation were discussed with the patient. The patient concurred with the proposed plan, giving informed consent.  The site of surgery was properly noted/marked if necessary per policy. The patient has been actively warmed in preoperative area. Preoperative antibiotics have been ordered and given within 1 hours of incision. Venous thrombosis prophylaxis have been ordered including bilateral sequential compression devices and chemical prophylaxis    Procedure Details: Ms. Quevedo was scheduled on elective basis for a sleeve gastrectomy, hiatal hernia repair and related procedures. On the day of surgery after verification of informed consent and anesthesia evaluation she was given subcutaneous heparin and was taken to the operating room and placed in supine position on the table. Timeout was done. Preoperative antibiotic was given, general anesthesia was established, standard sterile prepping and draping of abdominal area was performed. Entry into abdominal cavity was obtained after a pre-incision was using 0° 5 mm scope through an  optical trocar in the left upper quadrant, area without technical problems. Pneumoperitoneum was established and there was no iatrogenic injuries. Local anesthetic was injected in  tap block fashion bilaterally. Titan stapler port was placed below umbilicus, additional trocars were placed in the right upper quadrant and left anterior axillary line areas. Left lobe of liver was retracted with Jose retractor through epigastrium. Hiatus  was inspected and 2 cm separation between anterior crura with depression of phrenoesophageal membrane was noted.  Dissection was started in the prepyloric area using ligasure device the omentum was dissected from greater curvature of stomach all the way to the fundus and fundus was completely mobilized and left christelle was dissected and phrenoesophageal membrane was opened.  Then anteriorly hiatus was explored.  Distal esophagus was circumferentially dissected.  GE junction was below hiatal level however anterior crura had 2 cm separation.  After dissection was completed now 36 English VISI G-tube was inserted by anesthesiologist and guided into the pyloric channel. Now a sleeve gastrectomy was created. Titan stapler with seam guard buttressing was introduced through infra-umbilical port. stapler was delpoyed 5 CM from pylorus, 3 Cm away from incisura, 2 CM from angle of His. After satisfactory placemnet of stapler , it was fired without technical issues. Staple line was inspected and no technical issues were noted.  Hiatal hernia repair was done now with 2 figure-of-eight 0 Surgidek sutures using Endo Stitch device through the 12 mm port placed above the umbilical level.  There was nice approximation around the esophagus without any excessive narrowing.  Omentum was pexied to staple line using 2-0 Polysorb suture at 2 points of the proximal and mid staple line.. A leak test was performed through the gastric tube and it was negative. Specimen was retrieved through the  larger port site. Fascial defect in that area was closed with 3 interrupted 0 Vicryl sutures using endopasser device.  12 mm port site fascial defect was closed with 0 Vicryl using Endo passer as well.  Now the tube was removed and a gastroscopy was performed and scope was advanced all the way to the gastric pylorus, there was no narrowing, active bleeding or other technical issues and leak test was negative again. Scope was removed fluid was aspirated.  After final satisfactory examination abdominal cavity, there was no iatrogenic injuries or any active bleeding noted. Trocars were removed. Pneumoperitoneum was discontinued. Skin was closed with 3-0 Monocryl.  Dermabond was applied. Patient tolerated the operation well and was extubated in the OR and transferred to recovery room in stable condition. Instruments needle, sponge counts were correct.      Complications:  None; patient tolerated the procedure well.    Disposition: PACU - hemodynamically stable.  Condition: stable         Additional Details: Dr. Elder was scrubbed and actively assisted in the laparoscopic as well as endoscopic components of the operation.  There was no qualified resident available.    Attending Attestation: I was present and scrubbed for the entire procedure.    Tyshawn Arzola  Phone Number: [phone]

## 2024-03-13 NOTE — ANESTHESIA POSTPROCEDURE EVALUATION
Patient: Leonarda Quevedo    Procedure Summary       Date: 03/13/24 Room / Location: [facility] 09 / [facility]    Anesthesia Start: 0854 Anesthesia Stop:     Procedures:       LAPAROSCOPIC GASTRIC SLEEVE / EGD / TAP BLOCK      LAPAROSCOPIC HIATAL HERNIA REPAIR Diagnosis:       Morbid (severe) obesity due to excess calories (CMS/HCC)      (Morbid (severe) obesity due to excess calories (CMS/HCC) [E66.01])    Surgeons: Tyshawn Arzola MD Responsible Provider: Lui Rodriguez MD    Anesthesia Type: general ASA Status: 3            Anesthesia Type: general    Vitals Value Taken Time   /72 03/13/24 1033   Temp 36.3 °C (97.3 °F) 03/13/24 1033   Pulse 65 03/13/24 1035   Resp 14 03/13/24 1033   SpO2 99 % 03/13/24 1035   Vitals shown include unvalidated device data.    Anesthesia Post Evaluation    Patient location during evaluation: PACU  Patient participation: complete - patient participated  Level of consciousness: awake and alert  Pain score: 0  Pain management: adequate  Airway patency: patent  Cardiovascular status: acceptable  Respiratory status: acceptable  Hydration status: acceptable  Postoperative Nausea and Vomiting: none    No notable events documented.

## 2024-03-13 NOTE — BRIEF OP NOTE
Date: 3/13/2024  OR Location: [facility]    Name: Leonarda Quevedo, : 1965, Age: 58 y.o., MRN: [id number], Sex: female    Diagnosis  Pre-op Diagnosis     * Morbid (severe) obesity due to excess calories (CMS/HCC) [E66.01] Post-op Diagnosis     * Morbid (severe) obesity due to excess calories (CMS/HCC) [E66.01]     Procedures  LAPAROSCOPIC GASTRIC SLEEVE / EGD / TAP BLOCK  20864 - [place] LAPS GSTRC RSTRICTIV PX LONGITUDINAL GASTRECTOMY    LAPAROSCOPIC HIATAL HERNIA REPAIR  22440 - MN LAPS RPR PARAESPHGL HRNA INCL FUNDPLSTY W/O MESH    MN EGD TRANSORAL BIOPSY SINGLE/MULTIPLE [07185]  MN RPR PARAESOPH HIATAL HERNIA W/LAPT W/O MESH [09443]    Lap sleeve gastrectomy with titan stapler with seamguard and hiatal hernia repair without mesh.     Surgeons      * Tyshawn Arzola - Primary    Resident/Fellow/Other Assistant:  Surgeon(s) and Role:    Procedure Summary  Anesthesia: General  ASA: III  Anesthesia Staff: Anesthesiologist: Lui Rodriguez MD  CRNA: SAMANTHA Escamilla-CRNA  Estimated Blood Loss: 10mL  Intra-op Medications:   Administrations occurring from 0900 to 1130 on 24:   Medication Name Total Dose   BUPivacaine HCl (Marcaine) 0.25 % (2.5 mg/mL) injection 50 mL   sterile water irrigation solution 500 mL   sodium chloride 0.9 % irrigation solution 3,000 mL   ceFAZolin (Ancef) in dextrose 5% (ADV/MBP) IV 2 g 2 g              Anesthesia Record               Intraprocedure I/O Totals          Intake    LR bolus 1000.00 mL    ceFAZolin (Ancef) in dextrose 5% (ADV/MBP) IV 2 g 100.00 mL    Total Intake 1100 mL          Specimen:   ID Type Source Tests Collected by Time   1 : PORTION OF STOMACH Tissue STOMACH SLEEVE RESECTION SURGICAL PATHOLOGY EXAM Tyshawn Arzola MD 3/13/2024 1000        Staff:   Circulator: Sarita Mai RN  Scrub Person: Wendi Funk          Findings: Normal gastric anatomy.   EGD post procedure showing intact staple line, hemostatic, no leak.       Complications:  None; patient tolerated the  procedure well.     Disposition: PACU - hemodynamically stable.  Condition: stable  Specimens Collected:   ID Type Source Tests Collected by Time   1 : PORTION OF STOMACH Tissue STOMACH SLEEVE RESECTION SURGICAL PATHOLOGY EXAM Tyshawn Arzola MD 3/13/2024 1000     Attending Attestation: I was present and scrubbed for the entire procedure.    Tyshawn Arzola  Phone Number: [phone]

## 2024-03-13 NOTE — CARE PLAN
Problem: Pain  Goal: Takes deep breaths with improved pain control throughout the shift  Outcome: Progressing  Goal: Turns in bed with improved pain control throughout the shift  Outcome: Progressing  Goal: Walks with improved pain control throughout the shift  Outcome: Progressing  Goal: Performs ADL's with improved pain control throughout shift  Outcome: Progressing  Goal: Participates in PT with improved pain control throughout the shift  Outcome: Progressing  Goal: Free from opioid side effects throughout the shift  Outcome: Progressing  Goal: Free from acute confusion related to pain meds throughout the shift  Outcome: Progressing     Problem: Pain - Adult  Goal: Verbalizes/displays adequate comfort level or baseline comfort level  Outcome: Progressing     Problem: Safety - Adult  Goal: Free from fall injury  Outcome: Progressing     Problem: Discharge Planning  Goal: Discharge to home or other facility with appropriate resources  Outcome: Progressing     Problem: Chronic Conditions and Co-morbidities  Goal: Patient's chronic conditions and co-morbidity symptoms are monitored and maintained or improved  Outcome: Progressing     Problem: Skin  Goal: Decreased wound size/increased tissue granulation at next dressing change  Outcome: Progressing  Flowsheets (Taken 3/13/2024 1244)  Decreased wound size/increased tissue granulation at next dressing change: Promote sleep for wound healing  Goal: Participates in plan/prevention/treatment measures  Outcome: Progressing  Flowsheets (Taken 3/13/2024 1244)  Participates in plan/prevention/treatment measures: Increase activity/out of bed for meals  Goal: Prevent/manage excess moisture  Outcome: Progressing  Flowsheets (Taken 3/13/2024 1244)  Prevent/manage excess moisture: Moisturize dry skin  Goal: Prevent/minimize sheer/friction injuries  Outcome: Progressing  Flowsheets (Taken 3/13/2024 1244)  Prevent/minimize sheer/friction injuries: Increase activity/out of bed for  meals  Goal: Promote/optimize nutrition  Outcome: Progressing  Flowsheets (Taken 3/13/2024 1244)  Promote/optimize nutrition: Monitor/record intake including meals  Goal: Promote skin healing  Outcome: Progressing  Flowsheets (Taken 3/13/2024 1244)  Promote skin healing: Assess skin/pad under line(s)/device(s)

## 2024-03-13 NOTE — ANESTHESIA PROCEDURE NOTES
Airway  Date/Time: 3/13/2024 9:11 AM  Urgency: elective    Airway not difficult    Staffing  Performed: CRNA   Authorized by: Lui Rodriguez MD    Performed by: SAMANTHA Escamilla-CRNA  Patient location during procedure: OR    Indications and Patient Condition  Indications for airway management: anesthesia  Spontaneous Ventilation: absent  Sedation level: deep  Preoxygenated: yes  Patient position: sniffing  Mask difficulty assessment: 2 - vent by mask + OA or adjuvant +/- NMBA  Planned trial extubation    Final Airway Details  Final airway type: endotracheal airway      Successful airway: ETT  Cuffed: yes   Successful intubation technique: video laryngoscopy (elective Guy 4 use)  Facilitating devices/methods: intubating stylet and cricoid pressure (RSI)  Endotracheal tube insertion site: oral  Blade: Daisy  Blade size: #4  ETT size (mm): 7.5  Cormack-Lehane Classification: grade IIa - partial view of glottis  Placement verified by: chest auscultation, capnometry and palpation of cuff   Cuff volume (mL): 7  Measured from: lips  ETT to lips (cm): 21  Number of attempts at approach: 1  Ventilation between attempts: none  Number of other approaches attempted: 0    Additional Comments  Elective Guy 4 use. RSI with cricoid pressure held from beginning of sedation until confirmed EtCO2.

## 2024-03-14 ENCOUNTER — APPOINTMENT (OUTPATIENT)
Dept: RADIOLOGY | Facility: HOSPITAL | Age: 59
End: 2024-03-14
Payer: COMMERCIAL

## 2024-03-14 LAB
ALBUMIN SERPL BCP-MCNC: 3.7 G/DL (ref 3.4–5)
ALP SERPL-CCNC: 55 U/L (ref 33–110)
ALT SERPL W P-5'-P-CCNC: 34 U/L (ref 7–45)
ANION GAP SERPL CALC-SCNC: 11 MMOL/L (ref 10–20)
AST SERPL W P-5'-P-CCNC: 27 U/L (ref 9–39)
BASOPHILS # BLD AUTO: 0.03 X10*3/UL (ref 0–0.1)
BASOPHILS NFR BLD AUTO: 0.2 %
BILIRUB SERPL-MCNC: 1.1 MG/DL (ref 0–1.2)
BUN SERPL-MCNC: 12 MG/DL (ref 6–23)
CALCIUM SERPL-MCNC: 9.2 MG/DL (ref 8.6–10.3)
CHLORIDE SERPL-SCNC: 102 MMOL/L (ref 98–107)
CO2 SERPL-SCNC: 28 MMOL/L (ref 21–32)
CREAT SERPL-MCNC: 0.83 MG/DL (ref 0.5–1.05)
EGFRCR SERPLBLD CKD-EPI 2021: 83 ML/MIN/1.73M*2
EOSINOPHIL # BLD AUTO: 0.02 X10*3/UL (ref 0–0.7)
EOSINOPHIL NFR BLD AUTO: 0.1 %
ERYTHROCYTE [DISTWIDTH] IN BLOOD BY AUTOMATED COUNT: 13.3 % (ref 11.5–14.5)
GLUCOSE SERPL-MCNC: 80 MG/DL (ref 74–99)
HCT VFR BLD AUTO: 36.1 % (ref 36–46)
HGB BLD-MCNC: 11.5 G/DL (ref 12–16)
IMM GRANULOCYTES # BLD AUTO: 0.05 X10*3/UL (ref 0–0.7)
IMM GRANULOCYTES NFR BLD AUTO: 0.4 % (ref 0–0.9)
LYMPHOCYTES # BLD AUTO: 2.47 X10*3/UL (ref 1.2–4.8)
LYMPHOCYTES NFR BLD AUTO: 18.2 %
MCH RBC QN AUTO: 28.5 PG (ref 26–34)
MCHC RBC AUTO-ENTMCNC: 31.9 G/DL (ref 32–36)
MCV RBC AUTO: 90 FL (ref 80–100)
MONOCYTES # BLD AUTO: 1.09 X10*3/UL (ref 0.1–1)
MONOCYTES NFR BLD AUTO: 8 %
NEUTROPHILS # BLD AUTO: 9.89 X10*3/UL (ref 1.2–7.7)
NEUTROPHILS NFR BLD AUTO: 73.1 %
NRBC BLD-RTO: 0 /100 WBCS (ref 0–0)
PLATELET # BLD AUTO: 347 X10*3/UL (ref 150–450)
POTASSIUM SERPL-SCNC: 4 MMOL/L (ref 3.5–5.3)
PROT SERPL-MCNC: 6 G/DL (ref 6.4–8.2)
RBC # BLD AUTO: 4.03 X10*6/UL (ref 4–5.2)
SODIUM SERPL-SCNC: 137 MMOL/L (ref 136–145)
WBC # BLD AUTO: 13.6 X10*3/UL (ref 4.4–11.3)

## 2024-03-14 PROCEDURE — 74240 X-RAY XM UPR GI TRC 1CNTRST: CPT | Performed by: STUDENT IN AN ORGANIZED HEALTH CARE EDUCATION/TRAINING PROGRAM

## 2024-03-14 PROCEDURE — 36415 COLL VENOUS BLD VENIPUNCTURE: CPT | Performed by: STUDENT IN AN ORGANIZED HEALTH CARE EDUCATION/TRAINING PROGRAM

## 2024-03-14 PROCEDURE — 2500000001 HC RX 250 WO HCPCS SELF ADMINISTERED DRUGS (ALT 637 FOR MEDICARE OP): Performed by: STUDENT IN AN ORGANIZED HEALTH CARE EDUCATION/TRAINING PROGRAM

## 2024-03-14 PROCEDURE — 2500000005 HC RX 250 GENERAL PHARMACY W/O HCPCS: Performed by: STUDENT IN AN ORGANIZED HEALTH CARE EDUCATION/TRAINING PROGRAM

## 2024-03-14 PROCEDURE — C9113 INJ PANTOPRAZOLE SODIUM, VIA: HCPCS | Performed by: STUDENT IN AN ORGANIZED HEALTH CARE EDUCATION/TRAINING PROGRAM

## 2024-03-14 PROCEDURE — 80053 COMPREHEN METABOLIC PANEL: CPT | Performed by: STUDENT IN AN ORGANIZED HEALTH CARE EDUCATION/TRAINING PROGRAM

## 2024-03-14 PROCEDURE — 85025 COMPLETE CBC W/AUTO DIFF WBC: CPT | Performed by: STUDENT IN AN ORGANIZED HEALTH CARE EDUCATION/TRAINING PROGRAM

## 2024-03-14 PROCEDURE — 2500000004 HC RX 250 GENERAL PHARMACY W/ HCPCS (ALT 636 FOR OP/ED): Performed by: STUDENT IN AN ORGANIZED HEALTH CARE EDUCATION/TRAINING PROGRAM

## 2024-03-14 PROCEDURE — 1100000001 HC PRIVATE ROOM DAILY

## 2024-03-14 PROCEDURE — 74240 X-RAY XM UPR GI TRC 1CNTRST: CPT

## 2024-03-14 RX ORDER — TRAMADOL HYDROCHLORIDE 50 MG/1
50 TABLET ORAL EVERY 6 HOURS PRN
Status: DISCONTINUED | OUTPATIENT
Start: 2024-03-14 | End: 2024-03-15 | Stop reason: HOSPADM

## 2024-03-14 RX ORDER — ACETAMINOPHEN 160 MG/5ML
650 SOLUTION ORAL EVERY 6 HOURS
Status: DISCONTINUED | OUTPATIENT
Start: 2024-03-14 | End: 2024-03-15 | Stop reason: HOSPADM

## 2024-03-14 RX ORDER — TRAMADOL HYDROCHLORIDE 50 MG/1
25 TABLET ORAL EVERY 6 HOURS PRN
Status: DISCONTINUED | OUTPATIENT
Start: 2024-03-14 | End: 2024-03-15 | Stop reason: HOSPADM

## 2024-03-14 RX ORDER — METOCLOPRAMIDE HYDROCHLORIDE 5 MG/ML
10 INJECTION INTRAMUSCULAR; INTRAVENOUS EVERY 6 HOURS PRN
Status: DISCONTINUED | OUTPATIENT
Start: 2024-03-14 | End: 2024-03-15 | Stop reason: HOSPADM

## 2024-03-14 RX ORDER — LIDOCAINE 560 MG/1
1 PATCH PERCUTANEOUS; TOPICAL; TRANSDERMAL DAILY
Status: DISCONTINUED | OUTPATIENT
Start: 2024-03-14 | End: 2024-03-15 | Stop reason: HOSPADM

## 2024-03-14 RX ORDER — OXYCODONE HCL 5 MG/5 ML
10 SOLUTION, ORAL ORAL EVERY 6 HOURS PRN
Status: DISCONTINUED | OUTPATIENT
Start: 2024-03-14 | End: 2024-03-14

## 2024-03-14 RX ADMIN — METOCLOPRAMIDE HYDROCHLORIDE 10 MG: 5 INJECTION INTRAMUSCULAR; INTRAVENOUS at 14:11

## 2024-03-14 RX ADMIN — PANTOPRAZOLE SODIUM 40 MG: 40 INJECTION, POWDER, FOR SOLUTION INTRAVENOUS at 06:23

## 2024-03-14 RX ADMIN — OXYCODONE HYDROCHLORIDE 5 MG: 5 SOLUTION ORAL at 01:01

## 2024-03-14 RX ADMIN — KETOROLAC TROMETHAMINE 30 MG: 30 INJECTION, SOLUTION INTRAMUSCULAR; INTRAVENOUS at 09:30

## 2024-03-14 RX ADMIN — KETOROLAC TROMETHAMINE 30 MG: 30 INJECTION, SOLUTION INTRAMUSCULAR; INTRAVENOUS at 03:27

## 2024-03-14 RX ADMIN — ACETAMINOPHEN 650 MG: 160 SOLUTION ORAL at 19:56

## 2024-03-14 RX ADMIN — KETOROLAC TROMETHAMINE 30 MG: 30 INJECTION, SOLUTION INTRAMUSCULAR; INTRAVENOUS at 14:12

## 2024-03-14 RX ADMIN — SIMETHICONE 80 MG: 80 TABLET, CHEWABLE ORAL at 19:56

## 2024-03-14 RX ADMIN — HEPARIN SODIUM 5000 UNITS: 5000 INJECTION INTRAVENOUS; SUBCUTANEOUS at 06:23

## 2024-03-14 RX ADMIN — ACETAMINOPHEN 650 MG: 160 SOLUTION ORAL at 12:14

## 2024-03-14 RX ADMIN — ONDANSETRON 4 MG: 2 INJECTION INTRAMUSCULAR; INTRAVENOUS at 07:39

## 2024-03-14 RX ADMIN — KETOROLAC TROMETHAMINE 30 MG: 30 INJECTION, SOLUTION INTRAMUSCULAR; INTRAVENOUS at 20:06

## 2024-03-14 RX ADMIN — LIDOCAINE 1 PATCH: 4 PATCH TOPICAL at 21:18

## 2024-03-14 RX ADMIN — HYDROMORPHONE HYDROCHLORIDE 0.2 MG: 1 INJECTION, SOLUTION INTRAMUSCULAR; INTRAVENOUS; SUBCUTANEOUS at 02:33

## 2024-03-14 RX ADMIN — LIDOCAINE 1 PATCH: 4 PATCH TOPICAL at 10:07

## 2024-03-14 RX ADMIN — HEPARIN SODIUM 5000 UNITS: 5000 INJECTION INTRAVENOUS; SUBCUTANEOUS at 14:12

## 2024-03-14 RX ADMIN — HYDROMORPHONE HYDROCHLORIDE 0.2 MG: 1 INJECTION, SOLUTION INTRAMUSCULAR; INTRAVENOUS; SUBCUTANEOUS at 19:56

## 2024-03-14 RX ADMIN — HEPARIN SODIUM 5000 UNITS: 5000 INJECTION INTRAVENOUS; SUBCUTANEOUS at 21:19

## 2024-03-14 ASSESSMENT — PAIN SCALES - GENERAL
PAINLEVEL_OUTOF10: 2
PAINLEVEL_OUTOF10: 9
PAINLEVEL_OUTOF10: 6
PAINLEVEL_OUTOF10: 6

## 2024-03-14 ASSESSMENT — COGNITIVE AND FUNCTIONAL STATUS - GENERAL
DAILY ACTIVITIY SCORE: 23
EATING MEALS: A LITTLE
MOBILITY SCORE: 24

## 2024-03-14 ASSESSMENT — PAIN - FUNCTIONAL ASSESSMENT: PAIN_FUNCTIONAL_ASSESSMENT: 0-10

## 2024-03-14 NOTE — CARE PLAN
The patient's goals for the shift include tolerate liquids    The clinical goals for the shift include pain control

## 2024-03-14 NOTE — PROGRESS NOTES
"Leonarda Quevedo is a 58 y.o. female on day 1 of admission presenting with Morbid (severe) obesity due to excess calories (CMS/HCC).    Subjective   Patient is doing well, tolerated clears with occasional spit ups. Has ambulated, voiding and pain is under control.  Feels bilateral upper extremity generalized swelling.    Objective     Physical Exam  Vitals reviewed.   Constitutional:       Appearance: Normal appearance. She is obese.   HENT:      Head: Normocephalic and atraumatic.      Nose: Nose normal.      Mouth/Throat:      Mouth: Mucous membranes are moist.   Eyes:      Extraocular Movements: Extraocular movements intact.      Pupils: Pupils are equal, round, and reactive to light.   Cardiovascular:      Rate and Rhythm: Normal rate and regular rhythm.   Pulmonary:      Effort: Pulmonary effort is normal.   Abdominal:      Palpations: Abdomen is soft.      Tenderness: There is abdominal tenderness (mild incisional).      Comments: Incisions c/d/i   Musculoskeletal:         General: Normal range of motion.      Cervical back: Normal range of motion and neck supple.   Skin:     General: Skin is warm and dry.      Capillary Refill: Capillary refill takes less than 2 seconds.   Neurological:      General: No focal deficit present.      Mental Status: She is alert.   Psychiatric:         Mood and Affect: Mood normal.         Behavior: Behavior normal.         Thought Content: Thought content normal.         Judgment: Judgment normal.         Last Recorded Vitals  Blood pressure 114/66, pulse 67, temperature 37.5 °C (99.5 °F), resp. rate 16, height 1.626 m (5' 4\"), weight 112 kg (248 lb), last menstrual period 10/02/2023, SpO2 95 %.  Intake/Output last 3 Shifts:  I/O last 3 completed shifts:  In: 4010 (35.6 mL/kg) [P.O.:360; I.V.:2150 (19.1 mL/kg); IV Piggyback:1500]  Out: 350 (3.1 mL/kg) [Urine:350 (0.1 mL/kg/hr)]  Weight: 112.5 kg     Relevant Results              Results for orders placed or performed during the " hospital encounter of 03/13/24 (from the past 24 hour(s))   Comprehensive metabolic panel   Result Value Ref Range    Glucose 80 74 - 99 mg/dL    Sodium 137 136 - 145 mmol/L    Potassium 4.0 3.5 - 5.3 mmol/L    Chloride 102 98 - 107 mmol/L    Bicarbonate 28 21 - 32 mmol/L    Anion Gap 11 10 - 20 mmol/L    Urea Nitrogen 12 6 - 23 mg/dL    Creatinine 0.83 0.50 - 1.05 mg/dL    eGFR 83 >60 mL/min/1.73m*2    Calcium 9.2 8.6 - 10.3 mg/dL    Albumin 3.7 3.4 - 5.0 g/dL    Alkaline Phosphatase 55 33 - 110 U/L    Total Protein 6.0 (L) 6.4 - 8.2 g/dL    AST 27 9 - 39 U/L    Bilirubin, Total 1.1 0.0 - 1.2 mg/dL    ALT 34 7 - 45 U/L   CBC and Auto Differential   Result Value Ref Range    WBC 13.6 (H) 4.4 - 11.3 x10*3/uL    nRBC 0.0 0.0 - 0.0 /100 WBCs    RBC 4.03 4.00 - 5.20 x10*6/uL    Hemoglobin 11.5 (L) 12.0 - 16.0 g/dL    Hematocrit 36.1 36.0 - 46.0 %    MCV 90 80 - 100 fL    MCH 28.5 26.0 - 34.0 pg    MCHC 31.9 (L) 32.0 - 36.0 g/dL    RDW 13.3 11.5 - 14.5 %    Platelets 347 150 - 450 x10*3/uL    Neutrophils % 73.1 40.0 - 80.0 %    Immature Granulocytes %, Automated 0.4 0.0 - 0.9 %    Lymphocytes % 18.2 13.0 - 44.0 %    Monocytes % 8.0 2.0 - 10.0 %    Eosinophils % 0.1 0.0 - 6.0 %    Basophils % 0.2 0.0 - 2.0 %    Neutrophils Absolute 9.89 (H) 1.20 - 7.70 x10*3/uL    Immature Granulocytes Absolute, Automated 0.05 0.00 - 0.70 x10*3/uL    Lymphocytes Absolute 2.47 1.20 - 4.80 x10*3/uL    Monocytes Absolute 1.09 (H) 0.10 - 1.00 x10*3/uL    Eosinophils Absolute 0.02 0.00 - 0.70 x10*3/uL    Basophils Absolute 0.03 0.00 - 0.10 x10*3/uL                    Assessment/Plan   Active Problems:    Bariatric surgery status    Morbid (severe) obesity due to excess calories (CMS/HCC)    58F with morbid obesity s/p lap sleeve gastrectomy progressing well overall.     -UGI reviewed - advancing to bariatric full liquid diet   -continue IVF till discharge  -Pain and nausea control PRN  -SQH , SCDs, and ambulation    Anupama Vincent MD

## 2024-03-14 NOTE — PROGRESS NOTES
Surgery Date:  3/13/24  Surgeon:  Dariusz  Procedure:  sleeve gastrectomy    ASSESSMENT:    Current weight pounds:  236.0  Ht:   65.0 in.        BMI: 40.51  Previous weight pounds:   248.0    Initial start weight pounds:   246.0   6/2/23  EBW pounds:   96.0  Total weight change pounds: 10.0  %EBW Lost: 10.4%    PROGRESS:  Nutrition Interventions for last encounter (date):   1. Drink 64 oz of fluid daily  2. Drink enough protein shakes to meet your goal of 60-70 g of protein per day  3. Take 2 chewable multivitamins, 8626-5057 mg of calcium citrate, and 500 mcg of B12 daily  4. Get up and walk frequently throughout the day.     CHANGES IN TREATMENT:   Patient met goals:   Partially   24 hour food recall: pt on a full liquid dit  Beverages: 57-62 oz fluid water, soups, shakes ( not keeping track well)  Protein: 60-75 g daily  Alcohol: none    Vitamins:  2 Flintstones, 1500  mg Calcium, and 2500 mcg B12  Medications: see list  Physical Activity: walking    READINESS TO LEARN:  Motivation to learn:           Interested      Understanding of instruction: Good   Anticipated Compliance:   Good     Family Support: Unable to assess-family not present     Patient presents with post-op weight loss surgery sleeve gastrectomy. The pt is 1 week post op. Patient has lost 10.0 pounds since initial assessment accounting for 10.4% loss excess body weight.  Patient tolerating a full liquiddiet.  Protein intake is  adequate for post-op individual. Fluid consumption is adequate. Patient is supplementing recommended vitamin/minerals. Pt states no concerns and/or difficulties at this time.    Reviewed the puree diet.  Pt will advance on 3/25    Malnutrition Screening  Significant unintentional weight loss? n/a  Eating less than 75% of usual intake for more than 2 weeks? n/a      Nutrition Diagnosis:   1. Increased protein and nutrition needs related to altered GI function as evidenced by pt. s/p sleeve gastrectomy.   2.             Food-  and nutrition-related knowledge deficit related to lack of prior exposure to surgical weight loss information as evidenced by diet recall.     Nutrition Interventions:   1. Modify type and amount of food and nutrients within meals and snacks.  2. Comprehensive Nutrition Education  -Nutrition education materials: Support Group Schedule        Recommendations:    1. Continue to drink your protein shakes to meet your goal of 60-70 g of protein per day.    2. Continue to drink 64 oz. of zero calorie beverages per day  3. Begin  no drinking 30 min before, during the meal and for 30 minutes after the meal when you start the puree diet on 3/25  4. Continue to exercise   5. Advance to the puree diet on 3/25 for 2 weeks, then soft food  on 4/8  for 2 weeks.  if you do not tolerate the puree diet, go back to the liquid diet for a few more days and then try puree again.  See pages 12-14 in your nutrtion guidelines booklet in your gray folder for more details on puree food.   Try only the pureed  foods on page 14.  I will call you on 4/5 to review the soft diet before you start it.   6. Remember to eat slowly and chew thoroughly  7. Continue to take all of your vitamins and minerals.    8          Attend monthly support groups    Nutrition Monitoring and Evaluation:   1-2 pounds weight loss per week  Criteria: weight check, food recall  Need for Follow-up: 6 weeks post op

## 2024-03-14 NOTE — CARE PLAN
Problem: Pain  Goal: Takes deep breaths with improved pain control throughout the shift  Outcome: Progressing  Goal: Turns in bed with improved pain control throughout the shift  Outcome: Progressing  Goal: Walks with improved pain control throughout the shift  Outcome: Progressing  Goal: Performs ADL's with improved pain control throughout shift  Outcome: Progressing  Goal: Participates in PT with improved pain control throughout the shift  Outcome: Progressing  Goal: Free from opioid side effects throughout the shift  Outcome: Progressing  Goal: Free from acute confusion related to pain meds throughout the shift  Outcome: Progressing

## 2024-03-14 NOTE — CONSULTS
Consult received for bariatric diet instructions.  Pt. is s/p sleeve gastrectomy.  Pt. is demonstrating proper completion of hydration monitoring worksheet per bariatric guidelines/protocol. Pt reported some vomiting last night but tolerating liquids better today.   Reviewed importance of adequate hydration during recovery process.  Pt. had good understanding of this concept.  Pt following with a bariatric RD.

## 2024-03-15 VITALS
TEMPERATURE: 96.8 F | WEIGHT: 241.4 LBS | HEIGHT: 64 IN | BODY MASS INDEX: 41.21 KG/M2 | OXYGEN SATURATION: 95 % | DIASTOLIC BLOOD PRESSURE: 78 MMHG | RESPIRATION RATE: 18 BRPM | HEART RATE: 60 BPM | SYSTOLIC BLOOD PRESSURE: 139 MMHG

## 2024-03-15 PROCEDURE — 2500000005 HC RX 250 GENERAL PHARMACY W/O HCPCS: Performed by: STUDENT IN AN ORGANIZED HEALTH CARE EDUCATION/TRAINING PROGRAM

## 2024-03-15 PROCEDURE — C9113 INJ PANTOPRAZOLE SODIUM, VIA: HCPCS | Performed by: STUDENT IN AN ORGANIZED HEALTH CARE EDUCATION/TRAINING PROGRAM

## 2024-03-15 PROCEDURE — 2500000004 HC RX 250 GENERAL PHARMACY W/ HCPCS (ALT 636 FOR OP/ED): Performed by: STUDENT IN AN ORGANIZED HEALTH CARE EDUCATION/TRAINING PROGRAM

## 2024-03-15 PROCEDURE — 2500000001 HC RX 250 WO HCPCS SELF ADMINISTERED DRUGS (ALT 637 FOR MEDICARE OP): Performed by: STUDENT IN AN ORGANIZED HEALTH CARE EDUCATION/TRAINING PROGRAM

## 2024-03-15 RX ORDER — TRAMADOL HYDROCHLORIDE 25 MG/1
25 TABLET, COATED ORAL EVERY 6 HOURS PRN
Qty: 15 TABLET | Refills: 0 | Status: SHIPPED | OUTPATIENT
Start: 2024-03-15 | End: 2024-03-18

## 2024-03-15 RX ADMIN — ACETAMINOPHEN 650 MG: 160 SOLUTION ORAL at 03:25

## 2024-03-15 RX ADMIN — SODIUM CHLORIDE, POTASSIUM CHLORIDE, SODIUM LACTATE AND CALCIUM CHLORIDE 150 ML/HR: 600; 310; 30; 20 INJECTION, SOLUTION INTRAVENOUS at 05:55

## 2024-03-15 RX ADMIN — SIMETHICONE 80 MG: 80 TABLET, CHEWABLE ORAL at 05:55

## 2024-03-15 RX ADMIN — PANTOPRAZOLE SODIUM 40 MG: 40 INJECTION, POWDER, FOR SOLUTION INTRAVENOUS at 05:55

## 2024-03-15 RX ADMIN — LIDOCAINE 1 PATCH: 4 PATCH TOPICAL at 08:51

## 2024-03-15 RX ADMIN — HEPARIN SODIUM 5000 UNITS: 5000 INJECTION INTRAVENOUS; SUBCUTANEOUS at 05:55

## 2024-03-15 RX ADMIN — ACETAMINOPHEN 650 MG: 160 SOLUTION ORAL at 08:51

## 2024-03-15 RX ADMIN — TRAMADOL HYDROCHLORIDE 50 MG: 50 TABLET ORAL at 03:25

## 2024-03-15 RX ADMIN — TRAMADOL HYDROCHLORIDE 25 MG: 50 TABLET ORAL at 08:51

## 2024-03-15 RX ADMIN — ACETAMINOPHEN 650 MG: 160 SOLUTION ORAL at 13:54

## 2024-03-15 ASSESSMENT — ACTIVITIES OF DAILY LIVING (ADL): LACK_OF_TRANSPORTATION: NO

## 2024-03-15 ASSESSMENT — PAIN SCALES - GENERAL
PAINLEVEL_OUTOF10: 7
PAINLEVEL_OUTOF10: 6
PAINLEVEL_OUTOF10: 4
PAINLEVEL_OUTOF10: 4

## 2024-03-15 ASSESSMENT — PAIN - FUNCTIONAL ASSESSMENT: PAIN_FUNCTIONAL_ASSESSMENT: 0-10

## 2024-03-15 NOTE — DISCHARGE SUMMARY
Discharge Diagnosis  Morbid (severe) obesity due to excess calories (CMS/[facility])    Issues Requiring Follow-Up  Postoperative appointment    Test Results Pending At Discharge  Pending Labs       Order Current Status    Surgical Pathology Exam In process            Hospital Course  58F with morbid obesity s/p lap sleeve gastrectomy meeting discharge criteria.   She is tolerating her bariatric FLD without issues and has ambulated, voiding and pain is under control.    Pertinent Physical Exam At Time of Discharge  Physical Exam  Vitals reviewed.   Constitutional:       Appearance: Normal appearance. She is obese.   HENT:      Head: Normocephalic and atraumatic.      Nose: Nose normal.      Mouth/Throat:      Mouth: Mucous membranes are moist.   Eyes:      Extraocular Movements: Extraocular movements intact.      Pupils: Pupils are equal, round, and reactive to light.   Cardiovascular:      Rate and Rhythm: Normal rate and regular rhythm.   Pulmonary:      Effort: Pulmonary effort is normal.   Abdominal:      Palpations: Abdomen is soft.      Tenderness: There is abdominal tenderness (mild incisional).      Comments: Incisions c/d/i   Musculoskeletal:         General: Normal range of motion.      Cervical back: Normal range of motion and neck supple.   Skin:     General: Skin is warm and dry.      Capillary Refill: Capillary refill takes less than 2 seconds.   Neurological:      General: No focal deficit present.      Mental Status: She is alert.   Psychiatric:         Mood and Affect: Mood normal.         Behavior: Behavior normal.         Thought Content: Thought content normal.         Judgment: Judgment normal.         Home Medications     Medication List      START taking these medications     traMADoL 25 mg tablet; Take 25 mg by mouth every 6 hours if needed for   moderate pain (4 - 6) or severe pain (7 - 10).     CONTINUE taking these medications     acetaminophen 650 mg ER tablet; Commonly known as: Tylenol 8  HOUR   B-12 Compliance 1,000 mcg/mL kit; Generic drug: cyanocobalamin (vitamin   B-12); Inject 1,000 mcg as directed every 28 (twenty-eight) days.   COLLAGEN SKIN RENEWAL ORAL   ergocalciferol 1.25 MG (45563 UT) capsule; Commonly known as: Vitamin   D-2; TAKE 1 CAPSULE (47060 UNITS) BY MOUTH ONE TIME PER WEEK   lisinopriL-hydrochlorothiazide 10-12.5 mg tablet; Take 1 tablet by mouth   once daily.   metoprolol tartrate 50 mg tablet; Commonly known as: Lopressor; Take 1   tablet by mouth 1 time for 1 dose. Take tablet 2 hours before coronary CT   scan   multivitamin capsule   ondansetron ODT 4 mg disintegrating tablet; Commonly known as:   Zofran-ODT; Take 1 tablet (4 mg) by mouth every 6 hours if needed for   nausea or vomiting.     STOP taking these medications     oxyCODONE 5 mg/5 mL solution; Commonly known as: Roxicodone       Outpatient Follow-Up  Future Appointments   Date Time Provider Department [place]   3/21/2024  8:15 AM Mariella Kelly RDN, LD [id number] [place]   3/21/2024  8:30 AM Tyshawn Arzola MD [id number] [place]   3/22/2024 10:00 AM KIANA Miramontes [id number] [facility]   4/22/2024  9:00 AM Mariella Kelly RDN, LD [id number] [place]   4/22/2024 10:00 AM KIANA Acuna [id number] [facility]   6/10/2024  9:30 AM Mariella Kelly RDN, LD [id number] [place]   6/10/2024 10:15 AM KIANA Acuna [id number] [facility]       Anupama Vincent MD

## 2024-03-15 NOTE — PROGRESS NOTES
03/15/24 1122   Discharge Planning   Living Arrangements Family members   Support Systems Family members   Assistance Needed indepdent in all own care, drives, works.   Type of Residence Private residence   Number of Stairs to Enter Residence 1   Number of Stairs Within Residence   (none)   Do you have animals or pets at home? Yes   Type of Animals or Pets cat at moms   Who is requesting discharge planning? Provider   Home or Post Acute Services None   Patient expects to be discharged to: home- to moms house temporarily.   Does the patient need discharge transport arranged? No   Financial Resource Strain   How hard is it for you to pay for the very basics like food, housing, medical care, and heating? Not hard   Housing Stability   In the last 12 months, was there a time when you were not able to pay the mortgage or rent on time? N   In the last 12 months, how many places have you lived? 1   In the last 12 months, was there a time when you did not have a steady place to sleep or slept in a shelter (including now)? N   Transportation Needs   In the past 12 months, has lack of transportation kept you from medical appointments or from getting medications? no   In the past 12 months, has lack of transportation kept you from meetings, work, or from getting things needed for daily living? No     REMOTE COVERAGE- Called into patient room, role of TCC explained. Address, telephone, contact number confirmed. Pt independent in all own care, denies falls or use of assistive devices. Pt plans to dishcharge to her moms place as she normally drives to check on her mom everyday and will not be driving for a time after discharge. Pt reports that she may discharge today but is still dealing with nausea, abdominal pain, shoulder pain from the surgery gas. Reports that nausea makes her a little unsteady and that she has been using the IV pole for support. Plans to use her moms cane for a time once home. [facility] 23/23. Plan home no CT  needs. CT will follow.

## 2024-03-15 NOTE — CARE PLAN
Problem: Pain  Goal: Takes deep breaths with improved pain control throughout the shift  Outcome: Progressing  Goal: Turns in bed with improved pain control throughout the shift  Outcome: Progressing  Goal: Walks with improved pain control throughout the shift  Outcome: Progressing  Goal: Performs ADL's with improved pain control throughout shift  Outcome: Progressing  Goal: Participates in PT with improved pain control throughout the shift  Outcome: Progressing  Goal: Free from opioid side effects throughout the shift  Outcome: Progressing  Goal: Free from acute confusion related to pain meds throughout the shift  Outcome: Progressing     Problem: Pain - Adult  Goal: Verbalizes/displays adequate comfort level or baseline comfort level  Outcome: Progressing     Problem: Safety - Adult  Goal: Free from fall injury  Outcome: Progressing     Problem: Discharge Planning  Goal: Discharge to home or other facility with appropriate resources  Outcome: Progressing     Problem: Chronic Conditions and Co-morbidities  Goal: Patient's chronic conditions and co-morbidity symptoms are monitored and maintained or improved  Outcome: Progressing     Problem: Skin  Goal: Decreased wound size/increased tissue granulation at next dressing change  Outcome: Progressing  Goal: Participates in plan/prevention/treatment measures  Outcome: Progressing  Goal: Prevent/manage excess moisture  Outcome: Progressing  Goal: Prevent/minimize sheer/friction injuries  Outcome: Progressing  Goal: Promote/optimize nutrition  Outcome: Progressing  Goal: Promote skin healing  Outcome: Progressing

## 2024-03-15 NOTE — CARE PLAN
The patient's goals for the shift include tolerate liquids    The clinical goals for the shift include pain management    Problem: Pain  Goal: Takes deep breaths with improved pain control throughout the shift  3/15/2024 1339 by Juana Cooper RN  Outcome: Met  3/15/2024 0729 by Juana Cooper RN  Outcome: Progressing  3/15/2024 0728 by Juana Cooper RN  Outcome: Progressing  Goal: Turns in bed with improved pain control throughout the shift  3/15/2024 1339 by Juana Coopre RN  Outcome: Met  3/15/2024 0729 by Juana Cooper RN  Outcome: Progressing  3/15/2024 0728 by Juana Cooper RN  Outcome: Progressing  Goal: Walks with improved pain control throughout the shift  3/15/2024 1339 by Juana Cooper RN  Outcome: Met  3/15/2024 0729 by Juana Cooper RN  Outcome: Progressing  3/15/2024 0728 by Juana Cooper RN  Outcome: Progressing  Goal: Performs ADL's with improved pain control throughout shift  3/15/2024 1339 by Juana Cooper RN  Outcome: Met  3/15/2024 0729 by Juana Cooper RN  Outcome: Progressing  3/15/2024 0728 by Juana Cooper RN  Outcome: Progressing  Goal: Participates in PT with improved pain control throughout the shift  3/15/2024 1339 by Juana Cooper RN  Outcome: Met  3/15/2024 0729 by Juana Cooper RN  Outcome: Progressing  3/15/2024 0728 by Juana Cooper RN  Outcome: Progressing  Goal: Free from opioid side effects throughout the shift  3/15/2024 1339 by Juana Cooper RN  Outcome: Met  3/15/2024 0729 by Juana Cooper RN  Outcome: Progressing  3/15/2024 0728 by Juana Cooper RN  Outcome: Progressing  Goal: Free from acute confusion related to pain meds throughout the shift  3/15/2024 1339 by Juana Cooper RN  Outcome: Met  3/15/2024 0729 by Juana Cooper RN  Outcome: Progressing  3/15/2024 0728 by Juana Cooper RN  Outcome: Progressing     Problem: Pain - Adult  Goal: Verbalizes/displays adequate comfort  level or baseline comfort level  3/15/2024 1339 by Juana Cooper RN  Outcome: Met  3/15/2024 0729 by Juana Cooper RN  Outcome: Progressing  3/15/2024 0728 by Juana Cooper RN  Outcome: Progressing     Problem: Safety - Adult  Goal: Free from fall injury  3/15/2024 1339 by Juana Cooper RN  Outcome: Met  3/15/2024 0729 by Juana Cooper RN  Outcome: Progressing  3/15/2024 0728 by Juana Cooper RN  Outcome: Progressing     Problem: Discharge Planning  Goal: Discharge to home or other facility with appropriate resources  3/15/2024 1339 by Juana Cooper RN  Outcome: Met  3/15/2024 0729 by Juana Cooper RN  Outcome: Progressing  3/15/2024 0728 by Juana Cooper RN  Outcome: Progressing     Problem: Chronic Conditions and Co-morbidities  Goal: Patient's chronic conditions and co-morbidity symptoms are monitored and maintained or improved  3/15/2024 1339 by Juana Cooper RN  Outcome: Met  3/15/2024 0729 by Juana Cooper RN  Outcome: Progressing  3/15/2024 0728 by Juana Cooper RN  Outcome: Progressing     Problem: Skin  Goal: Decreased wound size/increased tissue granulation at next dressing change  3/15/2024 1339 by Juana Cooper RN  Outcome: Met  3/15/2024 0729 by Juana Cooper RN  Outcome: Progressing  3/15/2024 0728 by Juana Cooper RN  Outcome: Progressing  Goal: Participates in plan/prevention/treatment measures  3/15/2024 1339 by Juana Cooper RN  Outcome: Met  3/15/2024 0729 by Juana Cooper RN  Outcome: Progressing  3/15/2024 0728 by Juana Cooper RN  Outcome: Progressing  Goal: Prevent/manage excess moisture  3/15/2024 1339 by Juana Cooper RN  Outcome: Met  3/15/2024 0729 by Juana Cooper RN  Outcome: Progressing  3/15/2024 0728 by Juana Cooper RN  Outcome: Progressing  Goal: Prevent/minimize sheer/friction injuries  3/15/2024 1339 by Juana Cooper RN  Outcome: Met  3/15/2024 0729 by Juana Cooper,  RN  Outcome: Progressing  3/15/2024 0728 by Juana Cooper RN  Outcome: Progressing  Goal: Promote/optimize nutrition  3/15/2024 1339 by Juana Cooper RN  Outcome: Met  3/15/2024 0729 by Juana Cooper RN  Outcome: Progressing  3/15/2024 0728 by Juana Cooper RN  Outcome: Progressing  Goal: Promote skin healing  3/15/2024 1339 by Juana Cooper RN  Outcome: Met  3/15/2024 0729 by Juana Cooper RN  Outcome: Progressing  3/15/2024 0728 by Juana Cooper RN  Outcome: Progressing

## 2024-03-15 NOTE — CARE PLAN
The patient's goals for the shift include tolerate liquids    The clinical goals for the shift include pain management      Problem: Pain  Goal: Takes deep breaths with improved pain control throughout the shift  Outcome: Progressing  Goal: Turns in bed with improved pain control throughout the shift  Outcome: Progressing  Goal: Walks with improved pain control throughout the shift  Outcome: Progressing  Goal: Performs ADL's with improved pain control throughout shift  Outcome: Progressing  Goal: Participates in PT with improved pain control throughout the shift  Outcome: Progressing  Goal: Free from opioid side effects throughout the shift  Outcome: Progressing  Goal: Free from acute confusion related to pain meds throughout the shift  Outcome: Progressing     Problem: Pain - Adult  Goal: Verbalizes/displays adequate comfort level or baseline comfort level  Outcome: Progressing     Problem: Safety - Adult  Goal: Free from fall injury  Outcome: Progressing     Problem: Discharge Planning  Goal: Discharge to home or other facility with appropriate resources  Outcome: Progressing     Problem: Chronic Conditions and Co-morbidities  Goal: Patient's chronic conditions and co-morbidity symptoms are monitored and maintained or improved  Outcome: Progressing     Problem: Skin  Goal: Decreased wound size/increased tissue granulation at next dressing change  Outcome: Progressing  Goal: Participates in plan/prevention/treatment measures  Outcome: Progressing  Goal: Prevent/manage excess moisture  Outcome: Progressing  Goal: Prevent/minimize sheer/friction injuries  Outcome: Progressing  Goal: Promote/optimize nutrition  Outcome: Progressing  Goal: Promote skin healing  Outcome: Progressing

## 2024-03-18 ENCOUNTER — TELEPHONE (OUTPATIENT)
Dept: SURGERY | Facility: CLINIC | Age: 59
End: 2024-03-18
Payer: COMMERCIAL

## 2024-03-18 DIAGNOSIS — Z98.84 BARIATRIC SURGERY STATUS: Primary | ICD-10-CM

## 2024-03-18 PROBLEM — Z90.3 S/P GASTRIC SLEEVE PROCEDURE: Status: ACTIVE | Noted: 2024-03-18

## 2024-03-18 LAB
LABORATORY COMMENT REPORT: NORMAL
PATH REPORT.FINAL DX SPEC: NORMAL
PATH REPORT.GROSS SPEC: NORMAL
PATH REPORT.RELEVANT HX SPEC: NORMAL
PATH REPORT.TOTAL CANCER: NORMAL

## 2024-03-18 RX ORDER — TRAMADOL HYDROCHLORIDE 50 MG/1
50 TABLET ORAL 3 TIMES DAILY PRN
Qty: 15 TABLET | Refills: 0 | Status: SHIPPED | OUTPATIENT
Start: 2024-03-18 | End: 2024-03-24

## 2024-03-18 NOTE — PATIENT INSTRUCTIONS
You are doing great!   You may shower, let soap and water run over incisions, pat dry.  Things will get easier over time.   Remember to increase your fluids and protein to goals  Do not eat and drink at the same time.   Advance your diet to full liquids/pureed foods according to the diet guidelines.   See the dietician as must as you need too.   Slowly increase your exercise and activity, as you get all your protein you will be able to have more energy.   Take your omeprazole, open the capsule and sprinkle onto applesauce. Do not swallow whole.  Take your Multivitamin, B12 and calcium tablets.   Follow-up in 5 weeks for your 6 week post op visit.

## 2024-03-18 NOTE — TELEPHONE ENCOUNTER
Patient called in on Saturday and left a message stating her pharmacy was unable to fill the 25mg tramadol but they can fill 50mg and need the doctor to call to confirm if they can fill 50mg.  I called the patient back first thing Monday morning and let her know I have sent a message to Dr. Arzola and Dr. Vincent to review.  Reminded patient that if she is in need of assistance after hours or over the weekend she is to use the on call number.  Leaving a voicemail on my line will only be addressed when I am in the office during regular business hours.  The patient did state she was going to call the on call line, but thought since this wasn't an emergency she could wait for me to get back on Monday.      Patient doing well overall recovering post operatively.  Having some referred shoulder pain, reassured the patient that in the absence of fevers, profuse vomiting and severe abdominal pain, concern for a leak would be minimal.  This is like referred gas pain and the best thing for this is a lot of walking, heating pads and gas-x PRN.  Patient is meeting fluid and protein goals.  Patient is ambulating often.  Pain is otherwise well controlled.

## 2024-03-18 NOTE — PROGRESS NOTES
BARIATRIC SURGERY CLINIC  FOLLOW UP NOTE      Name: Leonarda Quevedo  MRN: [id number]      Index Surgery  Date of Surgery: 3/13/24   Surgeon: Dr. Tyshawn Arzola    Surgical Procedure: Laparoscopic sleeve gastrectomy  78556 and Hiatal hernia repair 49639    HPI: 58 year old female presenting today for a routine 1 week post op visit.  Patient is s/p sleeve gastrectomy with hiatal hernia repair on 3/13/24.    Diet Full Liquids     Exercise Ambulation    Concerns related to:  Nausea/Vomiting, Reflux: Denies  Abdominal Pain: Expected post op pain  Diarrhea/Constipation Denies     DAILY SUPPLEMENTS:  Calcium: Calcium Citrate w/ vitamin D (1200 - 1500mg)  Multivitamin & Minerals: 2 per day  Vitamin B12: 500 mcg/day     PPI:Omeprazole 40mg       Current Outpatient Medications   Medication Sig Dispense Refill    acetaminophen (Tylenol 8 HOUR) 650 mg ER tablet Take 1 tablet (650 mg) by mouth every 8 hours if needed for mild pain (1 - 3). Do not crush, chew, or split.      ascorbic acid/collagen hydr (COLLAGEN SKIN RENEWAL ORAL) Take 1 capsule by mouth once daily.      cyanocobalamin, vitamin B-12, (B-12 Compliance) 1,000 mcg/mL kit Inject 1,000 mcg as directed every 28 (twenty-eight) days. 1 kit 5    ergocalciferol (Vitamin D-2) 1.25 MG (06433 UT) capsule TAKE 1 CAPSULE (10462 UNITS) BY MOUTH ONE TIME PER WEEK 12 capsule 0    lisinopriL-hydrochlorothiazide 10-12.5 mg tablet Take 1 tablet by mouth once daily. 90 tablet 1    metoprolol tartrate (Lopressor) 50 mg tablet Take 1 tablet by mouth 1 time for 1 dose. Take tablet 2 hours before coronary CT scan 1 tablet 0    multivitamin capsule Take 1 capsule by mouth once daily.      ondansetron ODT (Zofran-ODT) 4 mg disintegrating tablet Take 1 tablet (4 mg) by mouth every 6 hours if needed for nausea or vomiting. 60 tablet 1    traMADol (Ultram) 50 mg tablet Take 1 tablet (50 mg) by mouth 3 times a day as needed for severe pain (7 - 10) for up to 6 days. 15 tablet 0     No current  facility-administered medications for this visit.       Comorbidities:  Patient Active Problem List   Diagnosis    Abnormal mammogram    Abnormal weight gain    Gastroesophageal reflux disease    Hypertension    Psychological factors affecting morbid obesity (CMS/[facility])    VINCE (obstructive sleep apnea)    Morbid obesity with BMI of 40.0-44.9, adult (CMS/[facility])    Left breast mass    Disturbance in sleep behavior    Chronic eczematous otitis externa of both ears    Abnormal EKG    Pre-operative cardiovascular examination    Cough    Gastritis    Vitamin D deficiency    Swelling of hand    Shoulder pain    Polyp of colon    Bariatric surgery status    Diaphragmatic hernia    Disorder of upper gastrointestinal tract    Fatigue    Heartburn    Hemorrhoids    Incomplete right bundle branch block (RBBB)    Itching of ear    Menopausal symptom    Numbness of finger    Metabolic syndrome    Pain of left hand    Sensorineural hearing loss (SNHL) of both ears    Benign neoplasm of sigmoid colon    Otalgia, right ear    Congenital musculoskeletal deformity of spine    Allergic rhinitis    Ankle pain    Back pain    Unspecified lump in the left breast, overlapping quadrants    Unspecified lump in the right breast, overlapping quadrants    History of substance dependence (CMS/[facility])    Breast asymmetry    Disorder of teeth and supporting structures    Other hyperlipidemia    Morbid (severe) obesity due to excess calories (CMS/[facility])    S/P gastric sleeve procedure         REVIEW OF SYSTEMS:  CONSTITUTIONAL: Patient denies fevers, chills, sweats and weight changes.  CARDIOVASCULAR: Patient denies chest pains, palpitations, orthopnea and paroxysmal nocturnal dyspnea.  RESPIRATORY: No dyspnea on exertion, no wheezing or cough.  GI: No nausea, vomiting, diarrhea, constipation, abdominal pain, hematochezia or melena.  MUSCULOSKELETAL: No myalgias or arthralgias.  NEUROLOGIC: No chronic headaches, no seizures. Patient denies numbness,  tingling or weakness.  PSYCHIATRIC: Patient denies problems with mood disturbance. No problems with anxiety.  ENDOCRINE: No excessive urination or excessive thirst.  DERMATOLOGIC: Patient denies any rashes or skin changes.    PHYSICAL EXAM:  There were no vitals taken for this visit.  Alert, well appearing, no acute distress, nourished, hydrated.  Anicteric sclera, no ptosis  Unlabored respirations.  Easily conversant without increased respiratory effort  Oriented to person, place, time.  Judgement intact.  Appropriate mood, affect.       ASSESSMENT & PLAN:  58 y.o. female presenting for follow up visit s/p sleeve gastrectomy and hiatal hernia repai on 3/13/24.  Overall doing well, has not been taking her lisinopril hydrochlorothiazide and blood pressure is elevated in clinic today has been having mild headaches at home.  Only took 1 dose of tramadol 50 mg and has not needed anymore.  Incisions are healing and she is tolerating diet without any nausea, vomiting, chest pain, shortness of breath, fever, chills, diarrhea.  Had vaginal discharge and some spotting and is on metronidazole.  No acute post bariatric surgery complications  No acute issues or concerns presented today.  Tolerating diet with appropriate protein and fluid intake  Taking appropriate supplements      Weight Loss: Initial  -> Current   Wt Readings from Last 1 Encounters:   03/15/24 110 kg (241 lb 6.5 oz)       Plan:  Follow-up PCP for medical care of nonsurgical issues.  Resume blood pressure medication and adjust after consultation with the PCP.  -Continue to follow protein forward, reduced calorie, whole foods based diet, follow diet direction of bariatric RD  -Continue to participate in regular exercise with goal to achieve 200-300 minutes per week of aerobic & resistance training for preservation of lean body mass.  -Continue multivitamin and supplements to support micronutrient needs  -Ongoing need for anti reflux medications discussed and  continued if appropriate - see orders.  -Bowel hygiene reviewed, encouraged adequate fluids, increased dietary fiber and reviewed as needed use of over the counter treatments to promote bowel support.   -Labs - we will get labs at your 3 month post op visit       Follow up in 5 weeks at your 6 week post op visit with [facility]son

## 2024-03-18 NOTE — TELEPHONE ENCOUNTER
Outbound call to patient to confirm she was able to  new tramadol prescription.  She states she hasn't picked it up yet but she did get a text stating it was ready. Asked if she can have cottage cheese, I advised that once her RD advances her to puree she can try blending until smooth, but she is not allowed to have this on the liquid diet.  She asked if she can have yogurt, I said that would be okay as long as it does not prohibit her from meeting her fluid goals.  Patient verbalized understanding.   [facility] YANIV GIPSON MD FACP   CATRACHITA ASHLEY MD  Diplomates American Board of Internal Medicine and Infecctious Diseases      MRN-[id number]  YOBANI JOHNSON is a 33y  Female     CC:  ADMITTED WITH ONE WEEK L PERIORBITAL PAIN PROGRESSING TO PRESEPTAL CELLULITIS AND ADMIT BY OPTHAL.  NEG PMH - NO DM  POS NANCY    ILL WITH HA - TX AS CLUSTER. EVENTUAL SWELLING AND PO AUGMENTIN - SAW OPTHAL AND ADMITTED  ABNL CT    Past Medical & Surgical Hx:  PAST MEDICAL & SURGICAL HISTORY:  GERD (gastroesophageal reflux disease)  Bipolar 1 disorder  H/O hernia repair  Hidradenitis suppurativa  H/O  section      Problem List:  HEALTH ISSUES - PROBLEM Dx:  GERD (gastroesophageal reflux disease): GERD (gastroesophageal reflux disease)  Bipolar 1 disorder: Bipolar 1 disorder  Periorbital cellulitis of left eye: Periorbital cellulitis of left eye    ANTIBIOTICS:   cefTRIAXone   IVPB  IV Intermittent        Review of Systems: - Negative except as mentioned below  PAIN EYE  DIFF SEEING L EYE      Physical Exam:    Vital Signs Last 24 Hrs  T(C): 37.1, Max: 37.9 ( @ 10:42)  T(F): 98.8, Max: 100.2 ( @ 10:42)  HR: 85 (83 - 108)  BP: 134/84 (112/61 - 134/84)  BP(mean): --  RR: 20 (18 - 20)  SpO2: 95% (95% - 98%)    Height (cm): 152.4 ( @ 10:42)  Weight (kg): 108.9 ( @ 10:42)  BMI (kg/m2): 46.9 ( @ 10:42)  BSA (m2): 2.02 ( @ 10:42)    GEN: NAD, pleasant. CLOSED L EYE  HEENT: normocephalic and atraumatic.ERYTHEM L EYELID AND PERIORB AREA. NO PROPTOSIS  PEERLA. LIMITED GAZE LEFT EYE UPWARD WITH DIPLOPIA. TENDER FRONTAL AND L MAXILLARY AREA  NECK: Supple. No carotid bruits.  No lymphadenopathy or thyromegaly.  LUNGS: Clear to auscultation.  HEART: Regular rate and rhythm without murmur.  ABDOMEN: Soft, nontender, and nondistended.  Positive bowel sounds.  No hepatosplenomegaly was noted.  EXTREMITIES: Without any cyanosis, clubbing, rash, lesions or edema.  NEUROLOGIC: INTACT EXCEPT L EYE ELEVATORS  MUSCULOSKELETAL:  SKIN: No ulceration or induration present.      Labs:                        12.1   8.7   )-----------( 335      ( 2017 09:14 )             37.6     2017 09:19    141    |  102    |  8.0    ----------------------------<  93     4.0     |  28.0   |  0.76     Ca    9.0        2017 09:19  Phos  3.5       2017 09:19  Mg     2.0       2017 09:19    TPro  7.8    /  Alb  3.7    /  TBili  0.4    /  DBili  x      /  AST  11     /  ALT  9      /  AlkPhos  93     2017 12:17          Platelet Count - Automated: 335 K/uL ( @ 09:14)  Hemoglobin: 12.1 g/dL ( @ 09:14)  WBC Count: 8.7 K/uL ( 09:14)  Hematocrit: 37.6 % ( 09:14)  Platelet Count - Automated: 348 K/uL ( @ 12:17)  WBC Count: 10.2 K/uL ( @ 12:17)  Hematocrit: 39.6 % ( @ 12:17)  Hemoglobin: 13.2 g/dL ( 12:17)    Sodium, Serum: 141 mmol/L ( 09:19)  Potassium, Serum: 4.0 mmol/L ( 09:19)  Blood Urea Nitrogen, Serum: 8.0 mg/dL ( 09:19)  Sodium, Serum: 142 mmol/L ( 12:17)  Potassium, Serum: 3.5 mmol/L ( 12:17)  Blood Urea Nitrogen, Serum: 10.0 mg/dL ( 12:17)          MICROBIOLOGY    Culture Results:   No significant bacterial growth. ( @ 13:11)  Culture Results:   No growth at 48 hours ( @ 12:36)  Culture Results:   No growth at 48 hours ( @ 12:35)      RADIOLOGY  Complete opacification of the left maxillary sinus is seen with some   vague areas of high attenuation. There is also mucosal thickening seen   involving both ethmoid sinuses left greater than right. Complete   opacification of the left frontal sinus is seen.    Both mastoid and middle ear regions appear clear.    Evaluation of the osseous structures with the appropriate window appears   unremarkable.    Impression: Evaluation of brain demonstrates no evidence acute   hemorrhage,is mass effect.    There is left periorbital soft tissue swelling seen. Soft tissue swelling   is seen involving the preseptal region with no post septal involvement   identified this time.          MICHELLE GIPSON MD FACP

## 2024-03-21 ENCOUNTER — OFFICE VISIT (OUTPATIENT)
Dept: SURGERY | Facility: CLINIC | Age: 59
End: 2024-03-21
Payer: COMMERCIAL

## 2024-03-21 ENCOUNTER — NUTRITION (OUTPATIENT)
Dept: SURGERY | Facility: CLINIC | Age: 59
End: 2024-03-21
Payer: COMMERCIAL

## 2024-03-21 VITALS
BODY MASS INDEX: 40.29 KG/M2 | DIASTOLIC BLOOD PRESSURE: 97 MMHG | WEIGHT: 236 LBS | HEIGHT: 64 IN | HEART RATE: 78 BPM | SYSTOLIC BLOOD PRESSURE: 150 MMHG | OXYGEN SATURATION: 98 % | TEMPERATURE: 97.8 F

## 2024-03-21 DIAGNOSIS — Z98.84 BARIATRIC SURGERY STATUS: ICD-10-CM

## 2024-03-21 DIAGNOSIS — Z98.890 POST-OPERATIVE NAUSEA AND VOMITING: ICD-10-CM

## 2024-03-21 DIAGNOSIS — G89.18 POST-OP PAIN: ICD-10-CM

## 2024-03-21 DIAGNOSIS — E66.01 MORBID (SEVERE) OBESITY DUE TO EXCESS CALORIES (MULTI): ICD-10-CM

## 2024-03-21 DIAGNOSIS — G47.33 OSA (OBSTRUCTIVE SLEEP APNEA): ICD-10-CM

## 2024-03-21 DIAGNOSIS — R11.2 POST-OPERATIVE NAUSEA AND VOMITING: ICD-10-CM

## 2024-03-21 DIAGNOSIS — Z90.3 S/P GASTRIC SLEEVE PROCEDURE: Primary | ICD-10-CM

## 2024-03-21 PROCEDURE — 99024 POSTOP FOLLOW-UP VISIT: CPT | Performed by: SURGERY

## 2024-03-21 PROCEDURE — 3080F DIAST BP >= 90 MM HG: CPT | Performed by: SURGERY

## 2024-03-21 PROCEDURE — 3008F BODY MASS INDEX DOCD: CPT | Performed by: SURGERY

## 2024-03-21 PROCEDURE — 3077F SYST BP >= 140 MM HG: CPT | Performed by: SURGERY

## 2024-03-27 ENCOUNTER — TELEPHONE (OUTPATIENT)
Dept: RHEUMATOLOGY | Facility: CLINIC | Age: 59
End: 2024-03-27
Payer: COMMERCIAL

## 2024-03-27 NOTE — TELEPHONE ENCOUNTER
Patient called and believes she has a yeast infection.  She did 3 day otc monistat but it has not cleared up.       She wanted to know if you could prescribe something for her.  She cannot get in with her GYN     [phone]

## 2024-03-28 ENCOUNTER — TELEMEDICINE (OUTPATIENT)
Dept: PRIMARY CARE | Facility: CLINIC | Age: 59
End: 2024-03-28
Payer: COMMERCIAL

## 2024-03-28 DIAGNOSIS — B37.9 YEAST INFECTION: Primary | ICD-10-CM

## 2024-03-28 DIAGNOSIS — Z90.3 S/P GASTRIC SLEEVE PROCEDURE: ICD-10-CM

## 2024-03-28 PROCEDURE — 3008F BODY MASS INDEX DOCD: CPT | Performed by: INTERNAL MEDICINE

## 2024-03-28 PROCEDURE — 99214 OFFICE O/P EST MOD 30 MIN: CPT | Performed by: INTERNAL MEDICINE

## 2024-03-28 RX ORDER — FLUCONAZOLE 150 MG/1
150 TABLET ORAL ONCE
Qty: 1 TABLET | Refills: 0 | Status: SHIPPED | OUTPATIENT
Start: 2024-03-28 | End: 2024-03-28

## 2024-03-28 NOTE — PROGRESS NOTES
Subjective   Patient ID: Leonarda Quevedo is a 58 y.o. female who presents for No chief complaint on file..    HPI    I performed this visit using real-time telehealth tools, including an audio/video connection between Leonarda Quevedo and myself Dr Dean in office [facility], [place], [place]  Patient on with me on a virtual visit  Common to get a yeast infection tried the 3 day monistat  Usually after surgery they gave her iv antibiotics   Also asking about calcium citrate if there are other forms   Also asking about b12 how much to take   They gave     Review of Systems  General: see hpi  Ears, Nose, Throat : see hpi  Dermatologic: Negative for new skin conditions, rash  Respiratory: see hpi  Cardiovascular: Negative for chest pain, palpitations, or leg swelling  Gastrointestinal: Negative for nausea/vomiting given prn medications , abdominal pain, changes in bowel habits  Neurological: Negative for dizziness see hpi headaches    Previous history  Past Medical History:   Diagnosis Date    Abnormal weight gain 06/24/2022    Abnormal weight gain    Abrasion of unspecified wrist, initial encounter 09/21/2017    Cat scratch of wrist    Adjustment disorder with depressed mood 03/08/2017    Grieving    Allergic rhinitis, unspecified 08/25/2022    Allergic rhinitis    Allergy, unspecified, initial encounter 07/29/2022    Allergy    Cough, unspecified 09/10/2018    Cough    Disappearance and death of family member 10/06/2016    Sudden death of family member    Disorder of teeth and supporting structures, unspecified 02/22/2016    Dental disorder    Dorsalgia, unspecified 07/29/2022    Back pain    Encounter for gynecological examination (general) (routine) without abnormal findings 03/08/2017    Visit for gynecologic examination    Encounter for other screening for malignant neoplasm of breast 09/07/2022    Breast screening    Encounter for screening for malignant neoplasm of colon 09/07/2022    Colon cancer  screening    Essential (primary) hypertension 11/09/2022    Hypertension    Gastro-esophageal reflux disease without esophagitis 09/07/2022    Gastroesophageal reflux disease    Morbid (severe) obesity due to excess calories (CMS/HCC) 11/21/2023    Obesity, unspecified 11/09/2022    Obesity    Otalgia, right ear 02/08/2016    Otalgia, right    Other conditions influencing health status 03/29/2017    History of cough    Other long term (current) drug therapy 11/09/2022    Medication management    Other specified soft tissue disorders 02/08/2016    Swelling of left hand    Pain in right ankle and joints of right foot 03/08/2017    Right ankle pain    Pain in right foot 12/08/2016    Right foot pain    Pain in right hand 09/07/2022    Bilateral hand pain    Pain in right hand 04/16/2018    Bilateral hand pain    Pain in right knee 07/29/2022    Bilateral knee pain    Pain in right knee 03/07/2018    Right knee pain    Pain in right shoulder 03/12/2020    Bilateral shoulder pain    Pain in unspecified hand 09/07/2022    Pain, hand    Personal history of other diseases of the nervous system and sense organs 12/08/2016    History of sleep disturbance    Pruritus, unspecified 11/09/2022    Ear itching    Snoring 09/08/2023     Past Surgical History:   Procedure Laterality Date    BACK SURGERY  03/25/2015    Back Surgery    BARIATRIC SURGERY  03/13/2024    Sleeve Gastrectomy. Dr. Arzloa. [facility].    HIATAL HERNIA REPAIR  03/13/2024    in conjunction with sleeve gastrectomy    TOTAL KNEE ARTHROPLASTY  03/25/2015    Knee Replacement     Social History     Tobacco Use    Smoking status: Every Day     Packs/day: .25     Types: Cigarettes    Smokeless tobacco: Never   Substance Use Topics    Alcohol use: Yes     Comment: rarely     Family History   Problem Relation Name Age of Onset    Hypertension Mother      Diabetes Mother      Hypertension Father      Diabetes Father      Diabetes Brother       No Known  Allergies  Current Outpatient Medications   Medication Instructions    acetaminophen (TYLENOL 8 HOUR) 650 mg, oral, Every 8 hours PRN, Do not crush, chew, or split.    ascorbic acid/collagen hydr (COLLAGEN SKIN RENEWAL ORAL) 1 capsule, oral, Daily    B-12 Compliance 1,000 mcg, injection, Every 28 days    ergocalciferol (VITAMIN D-2) 50,000 Units, oral, Weekly    lisinopriL-hydrochlorothiazide 10-12.5 mg tablet 1 tablet, oral, Daily    metoprolol tartrate (LOPRESSOR) 50 mg, oral, Once, Take tablet 2 hours before coronary CT scan    multivitamin capsule 1 capsule, oral, Daily    ondansetron ODT (ZOFRAN-ODT) 4 mg, oral, Every 6 hours PRN       Objective       Physical Exam  via R[place]  General: Well groomed, well nourished , speaks full sentences  Alert Cooperative , no apparent distress   Skin: Good color does not appear dehydrated   Eyes: Extra ocular muscle movements intact, anicteric sclerae  Neck: Supple, with good range of motion looking behind her and moving head sideways to cough   Neurological: Alert, oriented        Assessment/Plan   Leonarda Quevedo is a 58 y.o. female who presents for the concerns below:    Problem List Items Addressed This Visit    None  VAGINITIS : Hydration, loose clothing, check glucose if recurrent yeast, if postmenopausal make sure vaginal skin moisture is replenished.per hx likely fungal  diflucan 150 mg x 1  If recurs or does not improve will need  to see gynecology.  Dr Loomis or her NP staff     Had her gastric sleeve surgery on the 13th and had a follow-up   Can do a nurse visit so Bear our MA can teach her how to do it   Will need to bring all her pills and syringe b12 to her ffup with surgery to make sure she is taking the right formulation of tablets          Discussed with:   Return in :    Portions of this note were generated using digital voice recognition software, and may contain grammatical errors       Ernestine Vivas MD  03/28/24  1:26 PM

## 2024-04-05 ENCOUNTER — TELEPHONE (OUTPATIENT)
Dept: SURGERY | Facility: CLINIC | Age: 59
End: 2024-04-05
Payer: COMMERCIAL

## 2024-04-05 NOTE — TELEPHONE ENCOUNTER
Called pt to review the soft food diet to start on 4/8  She is tolerating the pureed diet well  She is drinking 32 oz water,  22-33  oz protein shakes (2-3 protein shakes per day)  Sometimes blends yogurt into her protein shake  She is taking her supplements   Reviewed the soft diet to start on 4/8.   Advised to read over pages 15-17 in NG booklet.   Mariella

## 2024-04-09 DIAGNOSIS — I10 HYPERTENSION, UNSPECIFIED TYPE: ICD-10-CM

## 2024-04-09 RX ORDER — LISINOPRIL AND HYDROCHLOROTHIAZIDE 10; 12.5 MG/1; MG/1
1 TABLET ORAL DAILY
Qty: 90 TABLET | Refills: 0 | Status: SHIPPED | OUTPATIENT
Start: 2024-04-09

## 2024-04-21 PROBLEM — K91.2 POSTOPERATIVE MALABSORPTION (HHS-HCC): Status: ACTIVE | Noted: 2024-04-21

## 2024-04-21 PROBLEM — Z71.82 EXERCISE COUNSELING: Status: ACTIVE | Noted: 2024-04-21

## 2024-04-22 ENCOUNTER — OFFICE VISIT (OUTPATIENT)
Dept: SURGERY | Facility: CLINIC | Age: 59
End: 2024-04-22
Payer: COMMERCIAL

## 2024-04-22 ENCOUNTER — NUTRITION (OUTPATIENT)
Dept: SURGERY | Facility: CLINIC | Age: 59
End: 2024-04-22
Payer: COMMERCIAL

## 2024-04-22 VITALS — WEIGHT: 232 LBS | BODY MASS INDEX: 38.65 KG/M2 | HEIGHT: 65 IN

## 2024-04-22 DIAGNOSIS — E88.810 METABOLIC SYNDROME: ICD-10-CM

## 2024-04-22 DIAGNOSIS — Z90.3 S/P GASTRIC SLEEVE PROCEDURE: ICD-10-CM

## 2024-04-22 DIAGNOSIS — E66.01 MORBID (SEVERE) OBESITY DUE TO EXCESS CALORIES (MULTI): ICD-10-CM

## 2024-04-22 DIAGNOSIS — K91.2 POSTOPERATIVE MALABSORPTION (HHS-HCC): Primary | ICD-10-CM

## 2024-04-22 DIAGNOSIS — I10 HYPERTENSION, UNSPECIFIED TYPE: ICD-10-CM

## 2024-04-22 DIAGNOSIS — E78.49 OTHER HYPERLIPIDEMIA: ICD-10-CM

## 2024-04-22 PROCEDURE — 99024 POSTOP FOLLOW-UP VISIT: CPT

## 2024-04-22 PROCEDURE — 3008F BODY MASS INDEX DOCD: CPT

## 2024-04-22 NOTE — ASSESSMENT & PLAN NOTE
Limiting dietary saturated fat encouraged <5-10% total kcal.  Increase dietary fiber to a minimum goal 25-30g per day.  Emphasis on whole foods based dietary carbohydrates.  Controlled carbohydrate intake reviewed for triglyceride reduction.    May consider high quality fish oil 2g EPA/DHA per day for CV benefit.  Regular exercise encouraged for CV risk reduction at least 150 min/week of moderate intense aerobic exercise encouraged.   Regular follow up with PCP/cardiology for lipid monitoring, CV risk reduction and appropriate lipid lowering therapies.

## 2024-04-22 NOTE — ASSESSMENT & PLAN NOTE
- Reviewed impact of diet, physical activity, stress & sleep to cardio metabolic health risks and ways to modify current lifestyle to reduce cardiometabolic health risks associated with obesity.  - Counseled on benefits of increased regular exercise, both aerobic & resistance training.  Reviewed benefits of resistance training to enhance/maintain lean body mass.  - Counseled on dietary modifications to support weight reduction, reduced calorie diet, encourage adequate protein, increased dietary fiber from fruit and vegetable to improve appetite/satiety regulation and quality of diet.  Discussed impact of processed foods and liquid calories to weight gain & contribution to dysfunctional appetite signals.   -  Reviewed importance of intensification of lifestyle therapy in weight reduction and maintenance, set behavioral modification goals of nutrition, physical activity or behavioral practices to benefit weight reduction.    - Discussed self monitoring practices to ensure reduce calorie diet, emphasizing increased dietary protein & fiber.  Reviewed protein targets per meal as recommendation to help with satiety and lean mass maintenance.

## 2024-04-22 NOTE — PROGRESS NOTES
BARIATRIC SURGERY CLINIC  Comprehensive Weight Management        Patient: Leonarda Quevedo   MRN: [id number]     Index Surgery:  Date: 3/13/24  Surgeon: Dariusz  Procedure: Sleeve + HHR     Virtual or Telephone Consent:  A telephone visit (audio or visual only) between the patient (at the originating site) and the provider (at the distant site) was utilized to provide this telehealth service. Verbal consent was requested and obtained from Leonarda Quevedo on this date, 04/23/24 for a bariatric telehealth visit.     HPI   Leonarda Quevedo is a 58 y.o. female presenting for 6 wk pov s/p LSG with HHR.     Diet:  - Regular food diet  - Achieving protein and fluid goals: yes  - Compliant with pouch rules, 30-30-30 rule- yes    Exercise:  - walking, increasing activity as tolerated.    Concerns related to:  Nausea/Vomiting, Reflux: denies  Abdominal Pain: denies  Diarrhea/Constipation- bowel function is regular    Daily Supplements:  Calcium: Calcium Citrate w/ vitamin D (1200 - 1500mg)  Multivitamin & Minerals: 2 per day  Vitamin B12: 500 mcg/day   Vitamin D3: 3000 units   Iron/Other: iron supplement   PPI: taking    Primary Medical Hx:  Patient Active Problem List   Diagnosis    Abnormal mammogram    Abnormal weight gain    Gastroesophageal reflux disease    Hypertension    Psychological factors affecting morbid obesity (Multi)    VINCE (obstructive sleep apnea)    Morbid obesity with BMI of 40.0-44.9, adult (Multi)    Left breast mass    Disturbance in sleep behavior    Chronic eczematous otitis externa of both ears    Abnormal EKG    Pre-operative cardiovascular examination    Cough    Gastritis    Vitamin D deficiency    Swelling of hand    Shoulder pain    Polyp of colon    Bariatric surgery status    Diaphragmatic hernia    Disorder of upper gastrointestinal tract    Fatigue    Heartburn    Hemorrhoids    Incomplete right bundle branch block (RBBB)    Itching of ear    Menopausal symptom    Numbness of finger    Metabolic syndrome    Pain  of left hand    Sensorineural hearing loss (SNHL) of both ears    Benign neoplasm of sigmoid colon    Otalgia, right ear    Congenital musculoskeletal deformity of spine    Allergic rhinitis    Ankle pain    Back pain    Unspecified lump in the left breast, overlapping quadrants    Unspecified lump in the right breast, overlapping quadrants    History of substance dependence (Multi)    Breast asymmetry    Disorder of teeth and supporting structures    Other hyperlipidemia    Morbid (severe) obesity due to excess calories (Multi)    S/P gastric sleeve procedure    Exercise counseling    Postoperative malabsorption (HHS-HCC)      Past Surgical History:   Procedure Laterality Date    BACK SURGERY  03/25/2015    Back Surgery    BARIATRIC SURGERY  03/13/2024    Sleeve Gastrectomy. Dr. Arzola. [facility].    HIATAL HERNIA REPAIR  03/13/2024    in conjunction with sleeve gastrectomy    TOTAL KNEE ARTHROPLASTY  03/25/2015    Knee Replacement      Social History     Socioeconomic History    Marital status: Single     Spouse name: Not on file    Number of children: Not on file    Years of education: Not on file    Highest education level: Not on file   Occupational History    Not on file   Tobacco Use    Smoking status: Every Day     Current packs/day: 0.25     Types: Cigarettes    Smokeless tobacco: Never   Substance and Sexual Activity    Alcohol use: Yes     Comment: rarely    Drug use: Not on file    Sexual activity: Not on file   Other Topics Concern    Not on file   Social History Narrative    Not on file     Social Determinants of Health     Financial Resource Strain: Low Risk  (3/15/2024)    Overall Financial Resource Strain (CARDIA)     Difficulty of Paying Living Expenses: Not hard at all   Food Insecurity: Not on file   Transportation Needs: No Transportation Needs (3/15/2024)    PRAPARE - Transportation     Lack of Transportation (Medical): No     Lack of Transportation (Non-Medical): No   Physical  "Activity: Not on file   Stress: Not on file   Social Connections: Not on file   Intimate Partner Violence: Not on file   Housing Stability: Low Risk  (3/15/2024)    Housing Stability Vital Sign     Unable to Pay for Housing in the Last Year: No     Number of Places Lived in the Last Year: 1     Unstable Housing in the Last Year: No     Family History   Problem Relation Name Age of Onset    Hypertension Mother      Diabetes Mother      Hypertension Father      Diabetes Father      Diabetes Brother        Current Outpatient Medications on File Prior to Visit   Medication Sig Dispense Refill    acetaminophen (Tylenol 8 HOUR) 650 mg ER tablet Take 1 tablet (650 mg) by mouth every 8 hours if needed for mild pain (1 - 3). Do not crush, chew, or split.      ascorbic acid/collagen hydr (COLLAGEN SKIN RENEWAL ORAL) Take 1 capsule by mouth once daily.      cyanocobalamin, vitamin B-12, (B-12 Compliance) 1,000 mcg/mL kit Inject 1,000 mcg as directed every 28 (twenty-eight) days. 1 kit 5    ergocalciferol (Vitamin D-2) 1.25 MG (19442 UT) capsule TAKE 1 CAPSULE (62325 UNITS) BY MOUTH ONE TIME PER WEEK 12 capsule 0    lisinopriL-hydrochlorothiazide 10-12.5 mg tablet TAKE 1 TABLET BY MOUTH EVERY DAY 90 tablet 0    metoprolol tartrate (Lopressor) 50 mg tablet Take 1 tablet by mouth 1 time for 1 dose. Take tablet 2 hours before coronary CT scan 1 tablet 0    multivitamin capsule Take 1 capsule by mouth once daily.      ondansetron ODT (Zofran-ODT) 4 mg disintegrating tablet Take 1 tablet (4 mg) by mouth every 6 hours if needed for nausea or vomiting. 60 tablet 1     No current facility-administered medications on file prior to visit.      No Known Allergies    REVIEW OF SYSTEMS:  Negative unless otherwise reviewed in HPI.    PHYSICAL EXAM   Physical Exam   Ht: 5'5\"      Wt: 232 lbs  BMI: 38.61   General- No acute distress, well appearing and well nourished.   Eyes Conjunctiva and lids: No erythema, swelling or discharge  Neck " appears supple  CV: self reports reg rate/rhythm  Pulmonary: Respiratory effort: Normal respiration. Unlabored.  Abdomen: Central adiposity  Neurologic - Coordination: Not assessed.  Extremities: No clubbing, cyanosis  Psychiatric - Orientation to person, place, and time: Normal. Mood and affect: Normal.    ASSESSMENT & PLAN  58 y.o. female presenting for 6 wk pov LSG with HHR.     Weight Impression:  -Initial Weight: 244 lbs  -Prior Wt: 241 lbs (1 week pov)  -Current BMI: 38.61  -Today's Weight: 232 lbs   -%Total Weight Loss: -4.92%    Problem List Items Addressed This Visit       Hypertension     Goal BP <120/80  Continue follow up with PCP for mgmt of antiHTN regimen  Encourage adherence to medications if prescribed for BP control  DASH/Mediterranean Diet lifestyle encouraged.  Weight reduction of 5-10% of clinical significance to improve BP control  Continues to work on lifestyle change goals to support blood pressure control and weight reduction.  Continue to follow up with your primary care physician         Relevant Orders    CBC and Auto Differential    Comprehensive Metabolic Panel    Metabolic syndrome     Recommend controlled CHO Diet to improve glycemic control.    Carbohydrate portions at meals <40g per meal depending on activity level reviewed.  Avoid sugar sweetened beverages  Engage in regular aerobic exercise at least 150 minutes per week for CV benefit.  Encourage self monitoring of blood glucose values  Optimal values of blood glucose:  Fasting < 90  PreMeal < 100  30 minutes post meal < 140  60 minutes post meal < 120  90 minutes post meal < 100    Discussed roles of combining dietary protein, increased dietary fiber and reduction of simple carbohydrates to benefit glycemic control.   Discussed importance of regular exercise for glycemic control.   Ongoing follow up with PCP, Endo and speciality providers for retinopathy & nephropathy screening encouraged annually.            Relevant Orders     Comprehensive Metabolic Panel    Other hyperlipidemia     Limiting dietary saturated fat encouraged <5-10% total kcal.  Increase dietary fiber to a minimum goal 25-30g per day.  Emphasis on whole foods based dietary carbohydrates.  Controlled carbohydrate intake reviewed for triglyceride reduction.    May consider high quality fish oil 2g EPA/DHA per day for CV benefit.  Regular exercise encouraged for CV risk reduction at least 150 min/week of moderate intense aerobic exercise encouraged.   Regular follow up with PCP/cardiology for lipid monitoring, CV risk reduction and appropriate lipid lowering therapies.         Morbid (severe) obesity due to excess calories (Multi)     - Reviewed impact of diet, physical activity, stress & sleep to cardio metabolic health risks and ways to modify current lifestyle to reduce cardiometabolic health risks associated with obesity.  - Counseled on benefits of increased regular exercise, both aerobic & resistance training.  Reviewed benefits of resistance training to enhance/maintain lean body mass.  - Counseled on dietary modifications to support weight reduction, reduced calorie diet, encourage adequate protein, increased dietary fiber from fruit and vegetable to improve appetite/satiety regulation and quality of diet.  Discussed impact of processed foods and liquid calories to weight gain & contribution to dysfunctional appetite signals.   -  Reviewed importance of intensification of lifestyle therapy in weight reduction and maintenance, set behavioral modification goals of nutrition, physical activity or behavioral practices to benefit weight reduction.    - Discussed self monitoring practices to ensure reduce calorie diet, emphasizing increased dietary protein & fiber.  Reviewed protein targets per meal as recommendation to help with satiety and lean mass maintenance.         Relevant Orders    CBC and Auto Differential    Comprehensive Metabolic Panel    S/P gastric sleeve  procedure     Patient is doing well   six weeks s/p SG    no acute issues   takes supplements   does regular exercise  no pain or GERD   on PPI    Recs:     doing well  No acute issues   advised to continue Diet,   increase exercise, reviewed goals.  FU with dietitian   continue vitamin supplementation, PPI   support groups  FU 6 weeks for 3 mo pov.         Relevant Orders    CBC and Auto Differential    Comprehensive Metabolic Panel    Postoperative malabsorption (HHS-HCC) - Primary     Check micronutrient levels - see orders  Adjust micronutrient supplementation per results  Continue recommended post bariatric surgery supplements         Relevant Orders    Ferritin    Folate    Iron and TIBC    Vitamin B12    Vitamin D 25-Hydroxy,Total (for eval of Vitamin D levels)   Please see Patient Instructions for today's relevant referrals, orders and instructions.  > 50% of time spent counseling on lifestyle modifications to support weight loss.      Follow Up: Follow up in about 6 weeks (around 6/3/2024).     Greer Bryson, APRN-CNP

## 2024-04-22 NOTE — ASSESSMENT & PLAN NOTE
Check micronutrient levels - see orders  Adjust micronutrient supplementation per results  Continue recommended post bariatric surgery supplements

## 2024-04-22 NOTE — PROGRESS NOTES
Surgery Date:  3/13/24  Surgeon:  Dariusz  Procedure:  sleeve gastrectomy    ASSESSMENT:  Current weight pounds:    232.0              Ht:    65.0  in.        BMI:  38.61  Previous weight pounds:   236.0   3/21/24  Initial start weight pounds:   246.0   6/2/23  EBW pounds:   96.0  Total weight change pounds:    14.0  %EBW Lost: 14.5%    PROGRESS:  Nutrition Interventions for last encounter (date):   1.          Continue to drink your protein shakes to meet your goal of 60-70 g of protein per day.    2.          Continue to drink 64 oz. of zero calorie beverages per day  3.          Begin  no drinking 30 min before, during the meal and for 30 minutes after the meal when you start the puree diet on 3/25  4.          Continue to exercise   5.          Advance to the puree diet on 3/25 for 2 weeks, then soft food  on 4/8  for 2 weeks.  if you do not tolerate the puree diet, go back to the liquid diet for a few more days and then try puree again.  See pages 12-14 in your nutrtion guidelines booklet in your gray folder for more details on puree food.   Try only the pureed  foods on page 14.  I will call you on 4/5 to review the soft diet before you start it.   6.          Remember to eat slowly and chew thoroughly  7.          Continue to take all of your vitamins and minerals.    8          Attend monthly support groups    CHANGES IN TREATMENT:   Patient met goals: Yes     24 hour food recall:   Breakfast:  protein shake   30 g  Snack:   salmon packet   18 g   Lunch:   2-3 oz  breaded chicken  strips   14-20 g  Snack:   none  Dinner:  soup beef and bok sibley   Snack:   none  Beverages: 64 oz of : 11 oz protein shake,  Protein 2o, water   Alcohol: none      Vitamins:  2 Flintstones, 1500  mg Calcium, and 2500 mcg B12/IM/month   Physical Activity:   walking on lunch break for 15 min a few times while working.    READINESS TO LEARN:  Motivation to learn:           Interested      Understanding of instruction: Good       Anticipated Compliance: Good       Family Support: Unable to assess-family not present     Patient presents with post-op weight loss surgery sleeve gastrectomy. The pt is 6 weeks postop.   Patient has lost 14.0 pounds since initial assessment accounting for 14.5% loss excess body weight.  Tolerating a soft  diet without difficulty.  Protein intake is adequate for post-op individual. Fluid consumption is adequate. Patient is supplementing recommended vitamin/minerals. Pt states no  concerns/difficulties with at this time.  Recommend increasing protein through food and wean off of protein shakes/drinks.  Recommend trying some You Tube exercise videos to help strengthen and tone.    Discussed the transition diet. Reminded pt to eat slowly, chew thoroughly and to try on new food at a time.     Malnutrition Screening  Significant unintentional weight loss? n/a  Eating less than 75% of usual intake for more than 2 weeks? n/a    Nutrition Diagnosis:   1. Increased protein and nutrition needs related to altered GI function as evidenced by pt. s/p sleeve gastrectomy.  2. Food- and nutrition-related knowledge deficit related to lack of prior exposure to surgical weight loss information as evidenced by diet recall.     Nutrition Interventions:   1. Modify type and amount of food and nutrients within meals and snacks.  2. Comprehensive Nutrition Education  -Nutrition education materials: SG schedule      Recommendations:    1. Work on increasing protein intake through food Eat 60-70 g of protein per day. Aim for 2 oz or 14 g protein per meal and have 2 high protein snacks that are  10-20 g protein each.  You can try a tuna or chicken packet, Greek yogurt, 2 string cheeses, Protein bars like Quest, Pure Protein, Premier, or Built Bars. you can also try protein chips form Quest or Atkins.    2. Drink 64 oz. of zero calorie beverages per day  3. Continue no drinking 30 min before, during the meal and for 30 minutes after the  meal  4. Try the You Tube exercise videos in your email. Increase intensity and duration of exercise  5. Advance to transition diet. Try raw veggies, fresh fruit and lean ground beef.   6. Eat slowly, chew thoroughly  7. Try one new food at a time.   8. Due to reformulation of Flintstones, it no longer meets your postop needs.  When you finish it, switch to either Equate from [employer] or UP & UP brand from [employer].  Take 1 tablet 2x/day  9.          Attend monthly support groups    Nutrition Monitoring and Evaluation:   1-2 pounds weight loss per week  Criteria: weight check, food recall  Need for Follow-up: 3 months      Mariella WEN, [facility]  Bariatric Surgery Dietitian  Phone: [phone]  Fax: [phone]

## 2024-04-22 NOTE — ASSESSMENT & PLAN NOTE
"Designing a Strength Program:  Select 3-4 exercises that target different major muscle groups.  - Emphasize the following movements \"pull, push, squat, hip hinge\"  \"Pull\" examples - pull up, bent over row or dumbbell row, pull down with weight bar or resistance band  \"Push\" examples - push up, bench press, chest flys, dips, overhead press, dumbbell bench press  \"Squat\" examples - air squat, chair squat, lunge steps, goblet squat, front squat, etc  \"Hip hinge\" examples - kettle bell swing, good morning, glute bridge, deadlift, suitcase pick ups, hip thrust    Perform two to three sets of eight to 15 repetitions of each exercise.  Try to use a resistance that feels like an \"8 out of 10\" effort, with 10 being the highest effort you can give.    To get stronger, try increasing either the weight, number of repetitions, number of sets or number of exercises every 4-8 weeks as you feel more comfortable with your current program.      Resources - HASFIT online workout videos on resistance training  "

## 2024-04-22 NOTE — PATIENT INSTRUCTIONS
"The following are the recommendations we discussed at your appointment today:  1. Nutrition  - Please make sure you are seeing the bariatric dietitian regularly.    Dietitian Information:   Ascencion Wolff: [phone]  [facility] [place] [phone]    Your Protein Goals will gradually increase as you get further out from surgery:  0-3 months - 60 GRAMS PER DAY  3-6 months - 60-75 GRAMS PER DAY  6-12 months - 75-90 GRAMS PER DAY  12+ months - 80-90 GRAMS PER DAY    - Follow Pouch Rules;.   Stop drinking 30 minutes before your meals, Take 30 minutes to eat your meal   - NO DRINKING DURING MEALS, Wait for 30 minutes after your meal to drink  - Eat 5 servings of fruits and veggies daily.  A serving is 1 small (tennis ball size) piece of whole fruit, 1/2 cup fresh fruit, 1 cup non starchy vegetables  - Eat 3 small meals and 1-2 healthy, protein rich, snacks per day.    EAT PROTEIN FIRST AT MEALS, 2nd non starchy vegetable or fruit serving, consume starches last and aim for whole grains of small portion.  This pattern of eating ensures you are getting full on high quality protein and higher fiber foods with many vitamins and minerals to support adequate nutrition first.      2. Exercise Recommendations:    Regular physical activity is CRITICAL for long term successful weight management.    Strive for a minimum weekly exercise goal of at least 200 minutes per week of aerobic exercise (45 minutes at least 5 days per week or 30 minutes daily)    Strength based resistance training is critical for helping to maintain and build lean body mass/muscle mass which is very important for long term health.  Having higher muscle mass is associated with better health outcomes and can help to maintain higher calorie expenditure.      Designing a Strength Program:  Select 3-4 exercises that target different major muscle groups.  - Emphasize the following movements \"pull, push, squat, hip hinge\"  \"Pull\" examples - pull up, " "bent over row or dumbbell row, pull down with weight bar or resistance band  \"Push\" examples - push up, bench press, chest flys, dips, overhead press, dumbbell bench press  \"Squat\" examples - air squat, chair squat, lunge steps, goblet squat, front squat, etc  \"Hip hinge\" examples - kettle bell swing, good morning, glute bridge, deadlift, suitcase pick ups, hip thrust    Perform two to three sets of eight to 15 repetitions of each exercise.  Try to use a resistance that feels like an \"8 out of 10\" effort, with 10 being the highest effort you can give.    To get stronger, try increasing either the weight, number of repetitions, number of sets or number of exercises every 4-8 weeks as you feel more comfortable with your current program.      3. Fluids  - Drink at least 60 oz of water every day.   - Wait 30 minutes before or after meals to drink fluids.  - Avoid carbonated beverages.    4. Vitamins  - Remember to take your multivitamins 2 times daily, once in the morning and once in the evening  - Take your calcium 2-3 times daily, at least 2 hours apart from the multivitamin - total daily dose at least 1200-1500mg per day.    - Vitamin D3 3000 units per day  - B12 - depending on your blood work and how well you absorb B12 from your multivitamin you may need additional B12 of 350-500mcg oral per day.    5. Labs  - We will check labs today and results will be communicated via [facility] My Chart  - A copy of the results can be viewed on  My Chart.      Follow-up with Dietitian for bariatric diet optimization  Follow-up with PCP for general health questions and ongoing management of routine health concerns      "

## 2024-04-22 NOTE — ASSESSMENT & PLAN NOTE
Recommend controlled CHO Diet to improve glycemic control.    Carbohydrate portions at meals <40g per meal depending on activity level reviewed.  Avoid sugar sweetened beverages  Engage in regular aerobic exercise at least 150 minutes per week for CV benefit.  Encourage self monitoring of blood glucose values  Optimal values of blood glucose:  Fasting < 90  PreMeal < 100  30 minutes post meal < 140  60 minutes post meal < 120  90 minutes post meal < 100    Discussed roles of combining dietary protein, increased dietary fiber and reduction of simple carbohydrates to benefit glycemic control.   Discussed importance of regular exercise for glycemic control.   Ongoing follow up with PCP, Endo and speciality providers for retinopathy & nephropathy screening encouraged annually.

## 2024-04-22 NOTE — ASSESSMENT & PLAN NOTE
Patient is doing well   six weeks s/p SG    no acute issues   takes supplements   does regular exercise  no pain or GERD   on PPI    Recs:     doing well  No acute issues   advised to continue Diet,   increase exercise, reviewed goals.  FU with dietitian   continue vitamin supplementation, PPI   support groups  FU 6 weeks for 3 mo pov.

## 2024-04-26 ENCOUNTER — LAB (OUTPATIENT)
Dept: LAB | Facility: LAB | Age: 59
End: 2024-04-26
Payer: COMMERCIAL

## 2024-04-26 DIAGNOSIS — Z90.3 S/P GASTRIC SLEEVE PROCEDURE: ICD-10-CM

## 2024-04-26 DIAGNOSIS — E88.810 METABOLIC SYNDROME: ICD-10-CM

## 2024-04-26 DIAGNOSIS — I10 HYPERTENSION, UNSPECIFIED TYPE: ICD-10-CM

## 2024-04-26 DIAGNOSIS — K91.2 POSTOPERATIVE MALABSORPTION (HHS-HCC): ICD-10-CM

## 2024-04-26 DIAGNOSIS — E66.01 MORBID (SEVERE) OBESITY DUE TO EXCESS CALORIES (MULTI): ICD-10-CM

## 2024-04-26 LAB
25(OH)D3 SERPL-MCNC: 31 NG/ML (ref 30–100)
ALBUMIN SERPL BCP-MCNC: 4.3 G/DL (ref 3.4–5)
ALP SERPL-CCNC: 66 U/L (ref 33–110)
ALT SERPL W P-5'-P-CCNC: 25 U/L (ref 7–45)
ANION GAP SERPL CALC-SCNC: 13 MMOL/L (ref 10–20)
AST SERPL W P-5'-P-CCNC: 23 U/L (ref 9–39)
BASOPHILS # BLD AUTO: 0.06 X10*3/UL (ref 0–0.1)
BASOPHILS NFR BLD AUTO: 0.6 %
BILIRUB SERPL-MCNC: 0.8 MG/DL (ref 0–1.2)
BUN SERPL-MCNC: 22 MG/DL (ref 6–23)
CALCIUM SERPL-MCNC: 10.3 MG/DL (ref 8.6–10.6)
CHLORIDE SERPL-SCNC: 100 MMOL/L (ref 98–107)
CO2 SERPL-SCNC: 31 MMOL/L (ref 21–32)
CREAT SERPL-MCNC: 0.78 MG/DL (ref 0.5–1.05)
EGFRCR SERPLBLD CKD-EPI 2021: 89 ML/MIN/1.73M*2
EOSINOPHIL # BLD AUTO: 0.17 X10*3/UL (ref 0–0.7)
EOSINOPHIL NFR BLD AUTO: 1.6 %
ERYTHROCYTE [DISTWIDTH] IN BLOOD BY AUTOMATED COUNT: 13.5 % (ref 11.5–14.5)
FERRITIN SERPL-MCNC: 91 NG/ML (ref 8–150)
FOLATE SERPL-MCNC: 18.7 NG/ML
GLUCOSE SERPL-MCNC: 136 MG/DL (ref 74–99)
HCT VFR BLD AUTO: 46.2 % (ref 36–46)
HGB BLD-MCNC: 14.2 G/DL (ref 12–16)
IMM GRANULOCYTES # BLD AUTO: 0.02 X10*3/UL (ref 0–0.7)
IMM GRANULOCYTES NFR BLD AUTO: 0.2 % (ref 0–0.9)
IRON SATN MFR SERPL: 31 % (ref 25–45)
IRON SERPL-MCNC: 125 UG/DL (ref 35–150)
LYMPHOCYTES # BLD AUTO: 2.26 X10*3/UL (ref 1.2–4.8)
LYMPHOCYTES NFR BLD AUTO: 21.5 %
MCH RBC QN AUTO: 27.5 PG (ref 26–34)
MCHC RBC AUTO-ENTMCNC: 30.7 G/DL (ref 32–36)
MCV RBC AUTO: 90 FL (ref 80–100)
MONOCYTES # BLD AUTO: 0.6 X10*3/UL (ref 0.1–1)
MONOCYTES NFR BLD AUTO: 5.7 %
NEUTROPHILS # BLD AUTO: 7.4 X10*3/UL (ref 1.2–7.7)
NEUTROPHILS NFR BLD AUTO: 70.4 %
NRBC BLD-RTO: 0 /100 WBCS (ref 0–0)
PLATELET # BLD AUTO: 342 X10*3/UL (ref 150–450)
POTASSIUM SERPL-SCNC: 3.9 MMOL/L (ref 3.5–5.3)
PROT SERPL-MCNC: 7.2 G/DL (ref 6.4–8.2)
RBC # BLD AUTO: 5.16 X10*6/UL (ref 4–5.2)
SODIUM SERPL-SCNC: 140 MMOL/L (ref 136–145)
TIBC SERPL-MCNC: 399 UG/DL (ref 240–445)
UIBC SERPL-MCNC: 274 UG/DL (ref 110–370)
VIT B12 SERPL-MCNC: 1011 PG/ML (ref 211–911)
WBC # BLD AUTO: 10.5 X10*3/UL (ref 4.4–11.3)

## 2024-04-26 PROCEDURE — 82746 ASSAY OF FOLIC ACID SERUM: CPT

## 2024-04-26 PROCEDURE — 82306 VITAMIN D 25 HYDROXY: CPT

## 2024-04-26 PROCEDURE — 82607 VITAMIN B-12: CPT

## 2024-04-26 PROCEDURE — 36415 COLL VENOUS BLD VENIPUNCTURE: CPT

## 2024-04-26 PROCEDURE — 82728 ASSAY OF FERRITIN: CPT

## 2024-04-26 PROCEDURE — 85025 COMPLETE CBC W/AUTO DIFF WBC: CPT

## 2024-04-26 PROCEDURE — 83540 ASSAY OF IRON: CPT

## 2024-04-26 PROCEDURE — 80053 COMPREHEN METABOLIC PANEL: CPT

## 2024-04-26 PROCEDURE — 83550 IRON BINDING TEST: CPT

## 2024-05-21 ENCOUNTER — APPOINTMENT (OUTPATIENT)
Dept: PRIMARY CARE | Facility: CLINIC | Age: 59
End: 2024-05-21
Payer: COMMERCIAL

## 2024-06-09 PROBLEM — Z71.3 DIETARY COUNSELING: Status: ACTIVE | Noted: 2024-06-09

## 2024-06-09 NOTE — PROGRESS NOTES
BARIATRIC SURGERY CLINIC  Comprehensive Weight Management        Patient: Leonarda Quevedo   MRN: [id number]      Index Surgery:  Date: 3/13/24  Surgeon: Dariusz  Procedure: Sleeve + HHR      Virtual or Telephone Consent:  A telephone visit (audio or visual only) between the patient (at the originating site) and the provider (at the distant site) was utilized to provide this telehealth service. Verbal consent was requested and obtained from Leonarda Quevedo on this date, 06/10/24 for a bariatric telehealth visit.      HPI   Leonarda Quevedo is a 58 y.o. female presenting for 3 mo pov s/p LSG with HHR.      Diet:  - Regular food diet  - Achieving protein and fluid goals: yes  - Compliant with pouch rules, 30-30-30 rule- yes     Exercise:  - walking, increasing activity as tolerated.     Concerns related to:  Nausea/Vomiting, Reflux: denies  Abdominal Pain: denies  Diarrhea/Constipation- bowel function is regular     Daily Supplements:  Calcium: Calcium Citrate w/ vitamin D (1200 - 1500mg)  Multivitamin & Minerals: 2 per day  Vitamin B12: 500 mcg/day   Vitamin D3: 3000 units   Iron/Other: iron supplement   PPI: taking    Primary Medical Hx:  Patient Active Problem List   Diagnosis    Abnormal mammogram    Abnormal weight gain    Gastroesophageal reflux disease    Hypertension    Psychological factors affecting morbid obesity (Multi)    VINCE (obstructive sleep apnea)    Morbid obesity with BMI of 40.0-44.9, adult (Multi)    Left breast mass    Disturbance in sleep behavior    Chronic eczematous otitis externa of both ears    Abnormal EKG    Pre-operative cardiovascular examination    Cough    Gastritis    Vitamin D deficiency    Swelling of hand    Shoulder pain    Polyp of colon    Bariatric surgery status    Diaphragmatic hernia    Disorder of upper gastrointestinal tract    Fatigue    Heartburn    Hemorrhoids    Incomplete right bundle branch block (RBBB)    Itching of ear    Menopausal symptom    Numbness of finger    Metabolic syndrome     Pain of left hand    Sensorineural hearing loss (SNHL) of both ears    Benign neoplasm of sigmoid colon    Otalgia, right ear    Congenital musculoskeletal deformity of spine    Allergic rhinitis    Ankle pain    Back pain    Unspecified lump in the left breast, overlapping quadrants    Unspecified lump in the right breast, overlapping quadrants    History of substance dependence (Multi)    Breast asymmetry    Disorder of teeth and supporting structures    Other hyperlipidemia    Morbid (severe) obesity due to excess calories (Multi)    S/P gastric sleeve procedure    Exercise counseling    Postoperative malabsorption (HHS-HCC)    Dietary counseling      Past Surgical History:   Procedure Laterality Date    BACK SURGERY  03/25/2015    Back Surgery    BARIATRIC SURGERY  03/13/2024    Sleeve Gastrectomy. Dr. Arzola. [facility].    HIATAL HERNIA REPAIR  03/13/2024    in conjunction with sleeve gastrectomy    TOTAL KNEE ARTHROPLASTY  03/25/2015    Knee Replacement      Social History     Socioeconomic History    Marital status: Single     Spouse name: Not on file    Number of children: Not on file    Years of education: Not on file    Highest education level: Not on file   Occupational History    Not on file   Tobacco Use    Smoking status: Every Day     Current packs/day: 0.25     Types: Cigarettes    Smokeless tobacco: Never   Substance and Sexual Activity    Alcohol use: Yes     Comment: rarely    Drug use: Not on file    Sexual activity: Not on file   Other Topics Concern    Not on file   Social History Narrative    Not on file     Social Determinants of Health     Financial Resource Strain: Low Risk  (3/15/2024)    Overall Financial Resource Strain (CARDIA)     Difficulty of Paying Living Expenses: Not hard at all   Food Insecurity: Not on file   Transportation Needs: No Transportation Needs (3/15/2024)    PRAPARE - Transportation     Lack of Transportation (Medical): No     Lack of Transportation  "(Non-Medical): No   Physical Activity: Not on file   Stress: Not on file   Social Connections: Not on file   Intimate Partner Violence: Not on file   Housing Stability: Low Risk  (3/15/2024)    Housing Stability Vital Sign     Unable to Pay for Housing in the Last Year: No     Number of Places Lived in the Last Year: 1     Unstable Housing in the Last Year: No     Family History   Problem Relation Name Age of Onset    Hypertension Mother      Diabetes Mother      Hypertension Father      Diabetes Father      Diabetes Brother        Current Outpatient Medications on File Prior to Visit   Medication Sig Dispense Refill    acetaminophen (Tylenol 8 HOUR) 650 mg ER tablet Take 1 tablet (650 mg) by mouth every 8 hours if needed for mild pain (1 - 3). Do not crush, chew, or split.      ascorbic acid/collagen hydr (COLLAGEN SKIN RENEWAL ORAL) Take 1 capsule by mouth once daily.      cyanocobalamin, vitamin B-12, (B-12 Compliance) 1,000 mcg/mL kit Inject 1,000 mcg as directed every 28 (twenty-eight) days. 1 kit 5    ergocalciferol (Vitamin D-2) 1.25 MG (43477 UT) capsule TAKE 1 CAPSULE (26241 UNITS) BY MOUTH ONE TIME PER WEEK 12 capsule 0    lisinopriL-hydrochlorothiazide 10-12.5 mg tablet TAKE 1 TABLET BY MOUTH EVERY DAY 90 tablet 0    metoprolol tartrate (Lopressor) 50 mg tablet Take 1 tablet by mouth 1 time for 1 dose. Take tablet 2 hours before coronary CT scan 1 tablet 0    multivitamin capsule Take 1 capsule by mouth once daily.      ondansetron ODT (Zofran-ODT) 4 mg disintegrating tablet Take 1 tablet (4 mg) by mouth every 6 hours if needed for nausea or vomiting. 60 tablet 1     No current facility-administered medications on file prior to visit.      @Allerg@    REVIEW OF SYSTEMS:  Negative unless otherwise reviewed in HPI.    PHYSICAL EXAM   Physical Exam   Ht: 5'5\"      Wt: 232 lbs             BMI: 38.61   General- No acute distress, well appearing and well nourished.   Eyes Conjunctiva and lids: No erythema, " swelling or discharge  Neck appears supple  CV: self reports reg rate/rhythm  Pulmonary: Respiratory effort: Normal respiration. Unlabored.  Abdomen: Central adiposity  Neurologic - Coordination: Not assessed.  Extremities: No clubbing, cyanosis  Psychiatric - Orientation to person, place, and time: Normal. Mood and affect: Normal.     ASSESSMENT & PLAN  58 y.o. female presenting for 3 mo pov LSG with HHR.      Weight Impression:  -Initial Weight: 244 lbs  -Prior Wt: 241 lbs (1 week pov)  -Today's Weight: 232 lbs ***  -%Total Weight Loss: -4.92% ***    Problem List Items Addressed This Visit       Hypertension     Goal BP <120/80  Continue follow up with PCP for mgmt of antiHTN regimen  Encourage adherence to medications if prescribed for BP control  DASH/Mediterranean Diet lifestyle encouraged.  Weight reduction of 5-10% of clinical significance to improve BP control  Continues to work on lifestyle change goals to support blood pressure control and weight reduction.  Continue to follow up with your primary care physician         Morbid obesity with BMI of 40.0-44.9, adult (Multi)     Steady weight loss s/p LSG   No acute issues or complications  - Current BMI:          Vitamin D deficiency    Metabolic syndrome - Primary     Recommend controlled CHO Diet to improve glycemic control.    Carbohydrate portions at meals <40g per meal depending on activity level reviewed.  Avoid sugar sweetened beverages  Engage in regular aerobic exercise at least 150 minutes per week for CV benefit.  Encourage self monitoring of blood glucose values  Optimal values of blood glucose:  Fasting < 90  PreMeal < 100  30 minutes post meal < 140  60 minutes post meal < 120  90 minutes post meal < 100    Discussed roles of combining dietary protein, increased dietary fiber and reduction of simple carbohydrates to benefit glycemic control.   Discussed importance of regular exercise for glycemic control.   Ongoing follow up with PCP, Endo and  speciality providers for retinopathy & nephropathy screening encouraged annually.          Other hyperlipidemia     Limiting dietary saturated fat encouraged <5-10% total kcal.  Increase dietary fiber to a minimum goal 25-30g per day.  Emphasis on whole foods based dietary carbohydrates.  Controlled carbohydrate intake reviewed for triglyceride reduction.    May consider high quality fish oil 2g EPA/DHA per day for CV benefit.  Regular exercise encouraged for CV risk reduction at least 150 min/week of moderate intense aerobic exercise encouraged.   Regular follow up with PCP/cardiology for lipid monitoring, CV risk reduction and appropriate lipid lowering therapies.         S/P gastric sleeve procedure     Patient is doing well   3 mo s/p SG    no acute issues   takes supplements   does regular exercise  no pain or GERD   on PPI     Recs:      doing well  No acute issues   advised to continue Diet,   increase exercise, reviewed goals.  FU with dietitian   continue vitamin supplementation, PPI   support groups  FU - 3 mo for 6 mo pov         Postoperative malabsorption (HHS-HCC)     Check micronutrient levels - see orders  Adjust micronutrient supplementation per results  Continue recommended post bariatric surgery supplements         Dietary counseling       - Reviewed impact of diet, physical activity, stress & sleep to cardio metabolic health risks and ways to modify current lifestyle to reduce cardiometabolic health risks associated with obesity.    - Counseled on benefits of increased regular exercise, both aerobic & resistance training.  Reviewed benefits of resistance training to enhance/maintain lean body mass.    - Counseled on dietary modifications to support weight reduction, reduced calorie diet, encourage adequate protein, increased dietary fiber from fruit and vegetable to improve appetite/satiety regulation and quality of diet.  Discussed impact of processed foods and liquid calories to weight gain &  contribution to dysfunctional appetite signals.     -  Reviewed importance of intensification of lifestyle therapy in weight reduction and maintenance, set behavioral modification goals of nutrition, physical activity or behavioral practices to benefit weight reduction.    - Discussed self monitoring practices to ensure reduce calorie diet, emphasizing increased dietary protein & fiber.  Reviewed protein targets per meal as recommendation to help with satiety and lean mass maintenance.           Please see Patient Instructions for today's relevant referrals, orders and instructions.  > 50% of time spent counseling on lifestyle modifications to support weight loss.      Follow Up: No follow-ups on file.   Greer Bryson, APRN-CNP

## 2024-06-09 NOTE — ASSESSMENT & PLAN NOTE
Patient is doing well   3 mo s/p SG    no acute issues   takes supplements   does regular exercise  no pain or GERD   on PPI     Recs:      doing well  No acute issues   advised to continue Diet,   increase exercise, reviewed goals.  FU with dietitian   continue vitamin supplementation, PPI   support groups  FU - 3 mo for 6 mo pov

## 2024-06-10 ENCOUNTER — APPOINTMENT (OUTPATIENT)
Dept: SURGERY | Facility: CLINIC | Age: 59
End: 2024-06-10
Payer: COMMERCIAL

## 2024-06-10 DIAGNOSIS — K91.2 POSTOPERATIVE MALABSORPTION (HHS-HCC): ICD-10-CM

## 2024-06-10 DIAGNOSIS — I10 HYPERTENSION, UNSPECIFIED TYPE: ICD-10-CM

## 2024-06-10 DIAGNOSIS — Z90.3 S/P GASTRIC SLEEVE PROCEDURE: ICD-10-CM

## 2024-06-10 DIAGNOSIS — E55.9 VITAMIN D DEFICIENCY: ICD-10-CM

## 2024-06-10 DIAGNOSIS — E66.01 MORBID OBESITY WITH BMI OF 40.0-44.9, ADULT (MULTI): ICD-10-CM

## 2024-06-10 DIAGNOSIS — Z71.3 DIETARY COUNSELING: ICD-10-CM

## 2024-06-10 DIAGNOSIS — E88.810 METABOLIC SYNDROME: Primary | ICD-10-CM

## 2024-06-10 DIAGNOSIS — E78.49 OTHER HYPERLIPIDEMIA: ICD-10-CM

## 2024-07-12 DIAGNOSIS — I10 HYPERTENSION, UNSPECIFIED TYPE: ICD-10-CM

## 2024-07-12 RX ORDER — LISINOPRIL AND HYDROCHLOROTHIAZIDE 10; 12.5 MG/1; MG/1
1 TABLET ORAL DAILY
Qty: 90 TABLET | Refills: 0 | Status: SHIPPED | OUTPATIENT
Start: 2024-07-12

## 2024-07-15 ENCOUNTER — APPOINTMENT (OUTPATIENT)
Dept: SURGERY | Facility: CLINIC | Age: 59
End: 2024-07-15
Payer: COMMERCIAL

## 2024-10-10 DIAGNOSIS — I10 HYPERTENSION, UNSPECIFIED TYPE: ICD-10-CM

## 2024-10-10 RX ORDER — LISINOPRIL AND HYDROCHLOROTHIAZIDE 10; 12.5 MG/1; MG/1
1 TABLET ORAL DAILY
Qty: 30 TABLET | Refills: 0 | Status: SHIPPED | OUTPATIENT
Start: 2024-10-10

## (undated) DEVICE — DRAPE, SHEET, ENDOSCOPY, GENERAL, FENESTRATED, ARMBOARD COVER, 98 X 123.5 IN, DISPOSABLE, LF, STERILE

## (undated) DEVICE — NEEDLE, EPIDURAL 17G X 4 1/2 PERIFIX

## (undated) DEVICE — CARE KIT, LAPAROSCOPIC, ADVANCED

## (undated) DEVICE — MAT, AIR TRANSFER, 39X81

## (undated) DEVICE — Device

## (undated) DEVICE — TROCAR, OPTICAL, BLADELESS, 12MM, THREADED, 100MM LENGTH

## (undated) DEVICE — NEEDLE, CLOSURE, OMNICLOSE

## (undated) DEVICE — RELOAD, ENDOSTITCH, POLYSORB, 2-0, 48 IN, ES9, VIOLET, SULU

## (undated) DEVICE — SLEEVE, KII, Z-THREAD, 5X100CM

## (undated) DEVICE — TUBE SET, PNEUMOLAR HEATED, SMOKE EVACU, HIGH-FLOW

## (undated) DEVICE — TROCAR, OPTICAL, BLADELESS, KII FIOS, 5 X 100MM, THREADED

## (undated) DEVICE — TROCAR, TITAN SGS, STANDARD 19MM

## (undated) DEVICE — SYRINGE, 20 CC, LUER SLIP

## (undated) DEVICE — BINDER, ABDOMINAL, 4 PANEL, 12 X 63-74 IN

## (undated) DEVICE — SLEEVE, VASO PRESS, CALF GARMENT, MEDIUM, GREEN

## (undated) DEVICE — MARKER, SKIN, REGULAR TIP, W/FLEXI-RULER

## (undated) DEVICE — SYRINGE, 30 CC, LUER LOCK

## (undated) DEVICE — RELOAD, ENDOSTITCH, SURGIDAC, 0, 48 IN, GREEN, SULU

## (undated) DEVICE — APPLICATOR, CHLORAPREP, W/ORANGE TINT, 26ML

## (undated) DEVICE — PROTECTOR, HEEL/ANKLE/ELBOW, UNIVERSAL

## (undated) DEVICE — DRAPE, SHEET, THREE QUARTER, FAN FOLD, 57 X 77 IN